# Patient Record
Sex: MALE | Race: WHITE | Employment: FULL TIME | URBAN - NONMETROPOLITAN AREA
[De-identification: names, ages, dates, MRNs, and addresses within clinical notes are randomized per-mention and may not be internally consistent; named-entity substitution may affect disease eponyms.]

---

## 2018-03-07 ENCOUNTER — APPOINTMENT (OUTPATIENT)
Dept: GENERAL RADIOLOGY | Age: 23
End: 2018-03-07
Payer: COMMERCIAL

## 2018-03-07 ENCOUNTER — HOSPITAL ENCOUNTER (EMERGENCY)
Age: 23
Discharge: HOME | End: 2018-03-07
Payer: COMMERCIAL

## 2018-03-07 VITALS
DIASTOLIC BLOOD PRESSURE: 98 MMHG | SYSTOLIC BLOOD PRESSURE: 133 MMHG | TEMPERATURE: 98.1 F | WEIGHT: 240 LBS | OXYGEN SATURATION: 98 % | BODY MASS INDEX: 42.51 KG/M2 | HEART RATE: 98 BPM | RESPIRATION RATE: 18 BRPM

## 2018-03-07 DIAGNOSIS — S60.511A ABRASION OF MULTIPLE SITES OF RIGHT HAND AND FINGER, INITIAL ENCOUNTER: Primary | ICD-10-CM

## 2018-03-07 DIAGNOSIS — S60.419A ABRASION OF MULTIPLE SITES OF RIGHT HAND AND FINGER, INITIAL ENCOUNTER: Primary | ICD-10-CM

## 2018-03-07 PROCEDURE — 6370000000 HC RX 637 (ALT 250 FOR IP): Performed by: STUDENT IN AN ORGANIZED HEALTH CARE EDUCATION/TRAINING PROGRAM

## 2018-03-07 PROCEDURE — 73130 X-RAY EXAM OF HAND: CPT

## 2018-03-07 PROCEDURE — 99283 EMERGENCY DEPT VISIT LOW MDM: CPT

## 2018-03-07 RX ORDER — IBUPROFEN 200 MG
400 TABLET ORAL ONCE
Status: COMPLETED | OUTPATIENT
Start: 2018-03-07 | End: 2018-03-07

## 2018-03-07 RX ORDER — IBUPROFEN 200 MG
TABLET ORAL ONCE
Status: COMPLETED | OUTPATIENT
Start: 2018-03-07 | End: 2018-03-07

## 2018-03-07 RX ADMIN — IBUPROFEN 400 MG: 200 TABLET, FILM COATED ORAL at 19:23

## 2018-03-07 RX ADMIN — BACITRACIN, NEOMYCIN, POLYMYXIN B 3.5 G: 400; 3.5; 5 OINTMENT TOPICAL at 19:23

## 2018-03-07 ASSESSMENT — ENCOUNTER SYMPTOMS
EYE REDNESS: 0
SHORTNESS OF BREATH: 0
RHINORRHEA: 0
COUGH: 0
BACK PAIN: 0
SORE THROAT: 0
DIARRHEA: 0
VOMITING: 0
EYE DISCHARGE: 0
WHEEZING: 0
ABDOMINAL PAIN: 0
NAUSEA: 0

## 2018-03-07 ASSESSMENT — PAIN DESCRIPTION - PAIN TYPE: TYPE: ACUTE PAIN

## 2018-03-07 ASSESSMENT — PAIN DESCRIPTION - ORIENTATION: ORIENTATION: RIGHT

## 2018-03-07 ASSESSMENT — PAIN SCALES - GENERAL: PAINLEVEL_OUTOF10: 5

## 2018-03-07 ASSESSMENT — PAIN DESCRIPTION - DESCRIPTORS: DESCRIPTORS: ACHING

## 2018-03-07 ASSESSMENT — PAIN DESCRIPTION - LOCATION: LOCATION: HAND

## 2018-03-08 NOTE — ED PROVIDER NOTES
Tympanostomy tube placement. CURRENT MEDICATIONS       Discharge Medication List as of 3/7/2018  7:51 PM      CONTINUE these medications which have NOT CHANGED    Details   naproxen (NAPROSYN) 500 MG tablet Take 1 tablet by mouth 2 times daily, Disp-60 tablet, R-0             ALLERGIES     is allergic to latex. FAMILY HISTORY     indicated that his mother is alive. He indicated that his father is alive. He indicated that his maternal grandmother is . He indicated that his maternal grandfather is . He indicated that his paternal grandmother is alive. He indicated that the status of his paternal grandfather is unknown.    family history includes Bipolar Disorder in his mother; COPD in his mother and paternal grandmother; Cancer in his maternal grandmother; Diabetes in his maternal grandfather; Heart Disease in his father and paternal grandmother; High Blood Pressure in his father, maternal grandfather, and maternal grandmother; Kidney Disease in his maternal grandmother; Obesity in his maternal grandfather; Other in his maternal grandfather; Stroke in his maternal grandmother. SOCIAL HISTORY      reports that he has been smoking Cigarettes. He has been smoking about 2.00 packs per day. He has quit using smokeless tobacco. He reports that he drinks alcohol. He reports that he uses drugs, including Marijuana, about 7 times per week. PHYSICAL EXAM     INITIAL VITALS:  weight is 240 lb (108.9 kg). His oral temperature is 98.1 °F (36.7 °C). His blood pressure is 133/98 (abnormal) and his pulse is 98. His respiration is 18 and oxygen saturation is 98%. Physical Exam   Constitutional: He is oriented to person, place, and time. He appears well-developed and well-nourished. HENT:   Head: Normocephalic and atraumatic. Right Ear: External ear normal.   Left Ear: External ear normal.   Eyes: Conjunctivae are normal. Right eye exhibits no discharge. Left eye exhibits no discharge.  No scleral

## 2019-06-10 ENCOUNTER — HOSPITAL ENCOUNTER (EMERGENCY)
Age: 24
Discharge: HOME | End: 2019-06-10
Payer: COMMERCIAL

## 2019-06-10 VITALS
OXYGEN SATURATION: 97 % | DIASTOLIC BLOOD PRESSURE: 99 MMHG | TEMPERATURE: 98.3 F | HEIGHT: 63 IN | HEART RATE: 83 BPM | RESPIRATION RATE: 18 BRPM | BODY MASS INDEX: 42.52 KG/M2 | SYSTOLIC BLOOD PRESSURE: 143 MMHG | WEIGHT: 240 LBS

## 2019-06-10 DIAGNOSIS — L23.7 POISON IVY: Primary | ICD-10-CM

## 2019-06-10 RX ORDER — METHYLPREDNISOLONE SODIUM SUCCINATE 125 MG/2ML
80 INJECTION, POWDER, LYOPHILIZED, FOR SOLUTION INTRAMUSCULAR; INTRAVENOUS ONCE
Status: DISCONTINUED | OUTPATIENT
Start: 2019-06-10 | End: 2019-06-10 | Stop reason: HOSPADM

## 2019-06-10 RX ORDER — DIPHENHYDRAMINE HCL 25 MG
25 TABLET ORAL ONCE
Status: DISCONTINUED | OUTPATIENT
Start: 2019-06-10 | End: 2019-06-10 | Stop reason: HOSPADM

## 2019-06-10 RX ORDER — FAMOTIDINE 20 MG/1
20 TABLET, FILM COATED ORAL ONCE
Status: DISCONTINUED | OUTPATIENT
Start: 2019-06-10 | End: 2019-06-10 | Stop reason: HOSPADM

## 2019-06-10 NOTE — LETTER
[address] [facility] [place] [facility], [address]  Phone: [phone]  Fax: [phone]     June 11, 2019    [address] [facility] [address]    Dear Abelino Opitz,    This letter is regarding your Emergency Department (ED) visit at [address] [address] on 6/10/19. Reyna Cogan wanted to make sure that you understand your discharge instructions and that you were able to fill any prescriptions that may have been ordered for you. Please contact the office at the above phone number if the ED advised you to follow up with Reyna Cogan, or if you have any further questions or needs. Also did you know -   *Visiting the ED for a non-emergency could result in higher co-pays than you would normally be subject to paying? [facility]) practices can often offer you an appointment on the same day that you call. *We have some [facility] offices that offer Walk-in appointments; check our website for availability in your community, [email or web address].      *Evisits are now available for patients for $36 through [employer] for certain conditions:  * Sinus, cold and or cough       * Diarrhea            * Headache  * Heartburn                                * Poison Cristiane          * Back pain     * Urinary problems                         If you do not have [employer] and are interested, please contact the office and a staff member may assist you or go to [email or web address].     Sincerely,     MARY JANE Kuo CNP and your [facility]

## 2019-06-11 ENCOUNTER — TELEPHONE (OUTPATIENT)
Dept: FAMILY MEDICINE CLINIC | Age: 24
End: 2019-06-11

## 2019-06-12 NOTE — ED PROVIDER NOTES
The patient was unable to be found in the waiting room after presenting for poison ivy. Multiple attempts were made to locate the patient. The patient eloped before I was able to see him. The patient eloped without initiation of my evaluation and workup. Therefore, the patient eloped before I could obtain appropriate lab work and imaging, make any necessary consults, admit or discharge, or initiate appropriate treatment.      Torsten Muir PA-C  6/12/19  0862     Torsten Muir PA-C  06/12/19 8099       Torsten Muir PA-C  06/12/19 0518

## 2019-10-09 ENCOUNTER — TELEPHONE (OUTPATIENT)
Dept: FAMILY MEDICINE CLINIC | Age: 24
End: 2019-10-09

## 2019-10-09 ENCOUNTER — OFFICE VISIT (OUTPATIENT)
Dept: FAMILY MEDICINE CLINIC | Age: 24
End: 2019-10-09
Payer: COMMERCIAL

## 2019-10-09 VITALS
TEMPERATURE: 98.3 F | BODY MASS INDEX: 39.25 KG/M2 | DIASTOLIC BLOOD PRESSURE: 62 MMHG | SYSTOLIC BLOOD PRESSURE: 102 MMHG | HEART RATE: 72 BPM | WEIGHT: 221.6 LBS | RESPIRATION RATE: 18 BRPM
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
## 2019-10-09 DIAGNOSIS — K29.00 ACUTE SUPERFICIAL GASTRITIS WITHOUT HEMORRHAGE: Primary | ICD-10-CM

## 2019-10-09 DIAGNOSIS — Z13.220 SCREENING FOR LIPID DISORDERS: ICD-10-CM

## 2019-10-09 DIAGNOSIS — Z72.51 HIGH RISK HETEROSEXUAL BEHAVIOR: ICD-10-CM

## 2019-10-09 DIAGNOSIS — R35.0 URINARY FREQUENCY: ICD-10-CM

## 2019-10-09 DIAGNOSIS — J45.30 MILD PERSISTENT ASTHMA WITHOUT COMPLICATION: ICD-10-CM

## 2019-10-09 DIAGNOSIS — M54.59 MECHANICAL LOW BACK PAIN: ICD-10-CM

## 2019-10-09 PROCEDURE — G8484 FLU IMMUNIZE NO ADMIN: HCPCS | Performed by: NURSE PRACTITIONER

## 2019-10-09 PROCEDURE — 99213 OFFICE O/P EST LOW 20 MIN: CPT | Performed by: NURSE PRACTITIONER

## 2019-10-09 PROCEDURE — G8427 DOCREV CUR MEDS BY ELIG CLIN: HCPCS | Performed by: NURSE PRACTITIONER

## 2019-10-09 PROCEDURE — G8417 CALC BMI ABV UP PARAM F/U: HCPCS | Performed by: NURSE PRACTITIONER

## 2019-10-09 PROCEDURE — 4004F PT TOBACCO SCREEN RCVD TLK: CPT | Performed by: NURSE PRACTITIONER

## 2019-10-09 RX ORDER — ALBUTEROL SULFATE 90 UG/1
2 AEROSOL, METERED RESPIRATORY (INHALATION) EVERY 6 HOURS PRN
Qty: 1 INHALER | Refills: 3 | Status: SHIPPED | OUTPATIENT
Start: 2019-10-09 | End: 2021-11-03 | Stop reason: SDUPTHER

## 2019-10-09 RX ORDER — AMOXICILLIN AND CLAVULANATE POTASSIUM 875; 125 MG/1; MG/1
1 TABLET, FILM COATED ORAL 2 TIMES DAILY WITH MEALS
Qty: 20 TABLET | Refills: 0 | Status: SHIPPED | OUTPATIENT
Start: 2019-10-09 | End: 2019-10-19

## 2019-10-09 RX ORDER — OMEPRAZOLE 40 MG/1
40 CAPSULE, DELAYED RELEASE ORAL
Qty: 30 CAPSULE | Refills: 0 | Status: SHIPPED | OUTPATIENT
Start: 2019-10-09 | End: 2020-04-01

## 2019-10-09 ASSESSMENT — ENCOUNTER SYMPTOMS
COUGH: 1
VOMITING: 0
CONSTIPATION: 0
WHEEZING: 1
DIARRHEA: 0
NAUSEA: 1
ABDOMINAL PAIN: 1
SHORTNESS OF BREATH: 1

## 2019-10-09 ASSESSMENT — PATIENT HEALTH QUESTIONNAIRE - PHQ9
2. FEELING DOWN, DEPRESSED OR HOPELESS: 0
SUM OF ALL RESPONSES TO PHQ QUESTIONS 1-9: 0
SUM OF ALL RESPONSES TO PHQ9 QUESTIONS 1 & 2: 0
1. LITTLE INTEREST OR PLEASURE IN DOING THINGS: 0
SUM OF ALL RESPONSES TO PHQ QUESTIONS 1-9: 0

## 2019-10-21 ENCOUNTER — HOSPITAL ENCOUNTER (OUTPATIENT)
Dept: PULMONOLOGY | Age: 24
Discharge: HOME | End: 2019-10-21
Payer: COMMERCIAL

## 2019-10-21 DIAGNOSIS — J45.30 MILD PERSISTENT ASTHMA WITHOUT COMPLICATION: ICD-10-CM

## 2019-10-21 PROCEDURE — 94060 EVALUATION OF WHEEZING: CPT

## 2019-10-21 PROCEDURE — 94729 DIFFUSING CAPACITY: CPT

## 2019-10-21 PROCEDURE — 94726 PLETHYSMOGRAPHY LUNG VOLUMES: CPT

## 2020-04-01 ENCOUNTER — HOSPITAL ENCOUNTER (INPATIENT)
Age: 25
LOS: 5 days | Discharge: HOME | DRG: 720 | End: 2020-04-06
Attending: EMERGENCY MEDICINE | Admitting: FAMILY MEDICINE
Payer: COMMERCIAL

## 2020-04-01 ENCOUNTER — APPOINTMENT (OUTPATIENT)
Dept: CT IMAGING | Age: 25
DRG: 720 | End: 2020-04-01
Payer: COMMERCIAL

## 2020-04-01 PROBLEM — R06.02 SHORTNESS OF BREATH: Status: ACTIVE | Noted: 2020-04-01

## 2020-04-01 LAB
ACINETOBACTER BAUMANNII FILM ARRAY: NOT DETECTED
ALBUMIN SERPL-MCNC: 3.6 G/DL (ref 3.5–5.1)
ALBUMIN SERPL-MCNC: 4 G/DL (ref 3.5–5.1)
ALP BLD-CCNC: 63 U/L (ref 38–126)
ALP BLD-CCNC: 78 U/L (ref 38–126)
ALT SERPL-CCNC: 10 U/L (ref 11–66)
ALT SERPL-CCNC: 12 U/L (ref 11–66)
AMPHETAMINE+METHAMPHETAMINE URINE SCREEN: NEGATIVE
ANION GAP SERPL CALCULATED.3IONS-SCNC: 11 MEQ/L (ref 8–16)
ANION GAP SERPL CALCULATED.3IONS-SCNC: 17 MEQ/L (ref 8–16)
AST SERPL-CCNC: 18 U/L (ref 5–40)
AST SERPL-CCNC: 24 U/L (ref 5–40)
BARBITURATE QUANTITATIVE URINE: NEGATIVE
BASE EXCESS (CALCULATED): 1.6 MMOL/L (ref -2.5–2.5)
BASOPHILS # BLD: 0.4 %
BASOPHILS ABSOLUTE: 0.1 THOU/MM3 (ref 0–0.1)
BENZODIAZEPINE QUANTITATIVE URINE: NEGATIVE
BILIRUB SERPL-MCNC: 0.4 MG/DL (ref 0.3–1.2)
BILIRUB SERPL-MCNC: 0.4 MG/DL (ref 0.3–1.2)
BILIRUBIN DIRECT: < 0.2 MG/DL (ref 0–0.3)
BILIRUBIN URINE: NEGATIVE
BLOOD, URINE: NEGATIVE
BOTTLE TYPE: ABNORMAL
BUN BLDV-MCNC: 11 MG/DL (ref 7–22)
BUN BLDV-MCNC: 13 MG/DL (ref 7–22)
C-REACTIVE PROTEIN: 0.54 MG/DL (ref 0–1)
CALCIUM SERPL-MCNC: 8.1 MG/DL (ref 8.5–10.5)
CALCIUM SERPL-MCNC: 8.5 MG/DL (ref 8.5–10.5)
CANDIDA ALBICANS FILM ARRAY: NOT DETECTED
CANDIDA GLABRATA FILM ARRAY: NOT DETECTED
CANDIDA KRUSEI FILM ARRAY: NOT DETECTED
CANDIDA PARAPSILOSIS FILM ARRAY: NOT DETECTED
CANDIDA TROPICALIS FILM ARRAY: NOT DETECTED
CANNABINOID QUANTITATIVE URINE: NEGATIVE
CARBAPENEM RESITANT FILM ARRAY: ABNORMAL
CHARACTER, URINE: CLEAR
CHLORIDE BLD-SCNC: 95 MEQ/L (ref 98–111)
CHLORIDE BLD-SCNC: 99 MEQ/L (ref 98–111)
CO2: 22 MEQ/L (ref 23–33)
CO2: 25 MEQ/L (ref 23–33)
COCAINE METABOLITE QUANTITATIVE URINE: POSITIVE
COLLECTED BY:: NORMAL
COLOR: YELLOW
CREAT SERPL-MCNC: 0.8 MG/DL (ref 0.4–1.2)
CREAT SERPL-MCNC: 1 MG/DL (ref 0.4–1.2)
EKG ATRIAL RATE: 123 BPM
EKG P AXIS: 58 DEGREES
EKG P-R INTERVAL: 136 MS
EKG Q-T INTERVAL: 314 MS
EKG QRS DURATION: 84 MS
EKG QTC CALCULATION (BAZETT): 449 MS
EKG R AXIS: 39 DEGREES
EKG T AXIS: 56 DEGREES
EKG VENTRICULAR RATE: 123 BPM
ENTERBACTER CLOACAE FILM ARRAY: NOT DETECTED
ENTERBACTERIACEAE FILM ARRAY: NOT DETECTED
ENTEROCOCCUS FILM ARRAY: NOT DETECTED
EOSINOPHIL # BLD: 0 %
EOSINOPHILS ABSOLUTE: 0 THOU/MM3 (ref 0–0.4)
ERYTHROCYTE [DISTWIDTH] IN BLOOD BY AUTOMATED COUNT: 12.9 % (ref 11.5–14.5)
ERYTHROCYTE [DISTWIDTH] IN BLOOD BY AUTOMATED COUNT: 41.5 FL (ref 35–45)
ESCHERICHIA COLI FILM ARRAY: NOT DETECTED
ETHYL ALCOHOL, SERUM: < 0.01 %
FERRITIN: 101 NG/ML (ref 22–322)
FLU A ANTIGEN: NEGATIVE
FLU B ANTIGEN: NEGATIVE
GFR SERPL CREATININE-BSD FRML MDRD: > 90 ML/MIN/1.73M2
GFR SERPL CREATININE-BSD FRML MDRD: > 90 ML/MIN/1.73M2
GLUCOSE BLD-MCNC: 126 MG/DL (ref 70–108)
GLUCOSE BLD-MCNC: 95 MG/DL (ref 70–108)
GLUCOSE URINE: NEGATIVE MG/DL
GROUP A STREP CULTURE, REFLEX: NEGATIVE
HAEMOPHILUS INFLUENZA FILM ARRAY: NOT DETECTED
HCO3: 27 MMOL/L (ref 23–28)
HCT VFR BLD CALC: 46.7 % (ref 42–52)
HEMOGLOBIN: 15.8 GM/DL (ref 14–18)
IMMATURE GRANS (ABS): 0.1 THOU/MM3 (ref 0–0.07)
IMMATURE GRANULOCYTES: 0.5 %
KETONES, URINE: NEGATIVE
KLEBSIELLA OXYTOCA FILM ARRAY: NOT DETECTED
KLEBSIELLA PNEUMONIAE FILM ARRAY: NOT DETECTED
LACTIC ACID, SEPSIS: 1.6 MMOL/L (ref 0.5–1.9)
LD: 324 U/L (ref 100–190)
LEGIONELLA PNEUMOPHILIA AG, URINE: NEGATIVE
LEUKOCYTE ESTERASE, URINE: NEGATIVE
LIPASE: 11.4 U/L (ref 5.6–51.3)
LISTERIA MONOCYTOGENES FILM ARRAY: NOT DETECTED
LYMPHOCYTES # BLD: 5.5 %
LYMPHOCYTES ABSOLUTE: 1.2 THOU/MM3 (ref 1–4.8)
MAGNESIUM: 1.9 MG/DL (ref 1.6–2.4)
MCH RBC QN AUTO: 29.9 PG (ref 26–33)
MCHC RBC AUTO-ENTMCNC: 33.8 GM/DL (ref 32.2–35.5)
MCV RBC AUTO: 88.4 FL (ref 80–94)
METHICILLIN RESISTANT FILM ARRAY: ABNORMAL
MONOCYTES # BLD: 5.3 %
MONOCYTES ABSOLUTE: 1.2 THOU/MM3 (ref 0.4–1.3)
MRSA SCREEN RT-PCR: NEGATIVE
NEISSERIA MENIGITIDIS FILM ARRAY: NOT DETECTED
NITRITE, URINE: NEGATIVE
NUCLEATED RED BLOOD CELLS: 0 /100 WBC
O2 SATURATION: 97 %
OPIATES, URINE: NEGATIVE
OSMOLALITY CALCULATION: 268.2 MOSMOL/KG (ref 275–300)
OSMOLALITY CALCULATION: 271 MOSMOL/KG (ref 275–300)
OXYCODONE: NEGATIVE
PCO2: 42 MMHG (ref 35–45)
PH BLOOD GAS: 7.41 (ref 7.35–7.45)
PH UA: 6 (ref 5–9)
PHENCYCLIDINE QUANTITATIVE URINE: NEGATIVE
PLATELET # BLD: 285 THOU/MM3 (ref 130–400)
PMV BLD AUTO: 9.6 FL (ref 9.4–12.4)
PO2: 86 MMHG (ref 71–104)
POTASSIUM REFLEX MAGNESIUM: 4.6 MEQ/L (ref 3.5–5.2)
POTASSIUM SERPL-SCNC: 4.7 MEQ/L (ref 3.5–5.2)
PRO-BNP: 39.3 PG/ML (ref 0–450)
PROCALCITONIN: 1.04 NG/ML (ref 0.01–0.09)
PROTEIN UA: NEGATIVE
PROTEUS FILM ARRAY: NOT DETECTED
PSEUDOMONAS AERUGINOSA FILM ARRAY: NOT DETECTED
RBC # BLD: 5.28 MILL/MM3 (ref 4.7–6.1)
REFLEX THROAT C + S: NORMAL
SEG NEUTROPHILS: 88.3 %
SEGMENTED NEUTROPHILS ABSOLUTE COUNT: 19.4 THOU/MM3 (ref 1.8–7.7)
SERRATIA MARCESCENS FILM ARRAY: NOT DETECTED
SODIUM BLD-SCNC: 134 MEQ/L (ref 135–145)
SODIUM BLD-SCNC: 135 MEQ/L (ref 135–145)
SOURCE OF BLOOD CULTURE: ABNORMAL
SPECIFIC GRAVITY, URINE: 1.01 (ref 1–1.03)
STAPH AUREUS FILM ARRAY: NOT DETECTED
STAPHYLOCOCCUS FILM ARRAY: NOT DETECTED
STREP AGALACTIAE FILM ARRAY: NOT DETECTED
STREP PNEUMONIAE FILM ARRAY: NOT DETECTED
STREP PYOCGENES FILM ARRAY: NOT DETECTED
STREPTOCOCCUS FILM ARRAY: DETECTED
TOTAL PROTEIN: 5.6 G/DL (ref 6.1–8)
TOTAL PROTEIN: 6.2 G/DL (ref 6.1–8)
TROPONIN T: < 0.01 NG/ML
TROPONIN T: < 0.01 NG/ML
TSH SERPL DL<=0.05 MIU/L-ACNC: 2.07 UIU/ML (ref 0.4–4.2)
UROBILINOGEN, URINE: 0.2 EU/DL (ref 0–1)
VANCOMYCIN RESISTANT ENTEROCOCCUS: NEGATIVE
VANCOMYCIN RESISTANT FILM ARRAY: ABNORMAL
WBC # BLD: 22 THOU/MM3 (ref 4.8–10.8)

## 2020-04-01 PROCEDURE — 93005 ELECTROCARDIOGRAM TRACING: CPT | Performed by: FAMILY MEDICINE

## 2020-04-01 PROCEDURE — 99285 EMERGENCY DEPT VISIT HI MDM: CPT

## 2020-04-01 PROCEDURE — U0002 COVID-19 LAB TEST NON-CDC: HCPCS

## 2020-04-01 PROCEDURE — 2700000000 HC OXYGEN THERAPY PER DAY

## 2020-04-01 PROCEDURE — 83690 ASSAY OF LIPASE: CPT

## 2020-04-01 PROCEDURE — 87081 CULTURE SCREEN ONLY: CPT

## 2020-04-01 PROCEDURE — 6370000000 HC RX 637 (ALT 250 FOR IP): Performed by: EMERGENCY MEDICINE

## 2020-04-01 PROCEDURE — 80053 COMPREHEN METABOLIC PANEL: CPT

## 2020-04-01 PROCEDURE — 2580000003 HC RX 258: Performed by: EMERGENCY MEDICINE

## 2020-04-01 PROCEDURE — 93010 ELECTROCARDIOGRAM REPORT: CPT | Performed by: NUCLEAR MEDICINE

## 2020-04-01 PROCEDURE — 86140 C-REACTIVE PROTEIN: CPT

## 2020-04-01 PROCEDURE — 82728 ASSAY OF FERRITIN: CPT

## 2020-04-01 PROCEDURE — 80307 DRUG TEST PRSMV CHEM ANLYZR: CPT

## 2020-04-01 PROCEDURE — 99223 1ST HOSP IP/OBS HIGH 75: CPT | Performed by: PHYSICIAN ASSISTANT

## 2020-04-01 PROCEDURE — 87077 CULTURE AEROBIC IDENTIFY: CPT

## 2020-04-01 PROCEDURE — 81003 URINALYSIS AUTO W/O SCOPE: CPT

## 2020-04-01 PROCEDURE — 2060000000 HC ICU INTERMEDIATE R&B

## 2020-04-01 PROCEDURE — 71275 CT ANGIOGRAPHY CHEST: CPT

## 2020-04-01 PROCEDURE — 6360000002 HC RX W HCPCS: Performed by: FAMILY MEDICINE

## 2020-04-01 PROCEDURE — 87899 AGENT NOS ASSAY W/OPTIC: CPT

## 2020-04-01 PROCEDURE — 6370000000 HC RX 637 (ALT 250 FOR IP): Performed by: FAMILY MEDICINE

## 2020-04-01 PROCEDURE — 87880 STREP A ASSAY W/OPTIC: CPT

## 2020-04-01 PROCEDURE — 85025 COMPLETE CBC W/AUTO DIFF WBC: CPT

## 2020-04-01 PROCEDURE — 87804 INFLUENZA ASSAY W/OPTIC: CPT

## 2020-04-01 PROCEDURE — 83605 ASSAY OF LACTIC ACID: CPT

## 2020-04-01 PROCEDURE — 87449 NOS EACH ORGANISM AG IA: CPT

## 2020-04-01 PROCEDURE — 87641 MR-STAPH DNA AMP PROBE: CPT

## 2020-04-01 PROCEDURE — 6360000004 HC RX CONTRAST MEDICATION: Performed by: EMERGENCY MEDICINE

## 2020-04-01 PROCEDURE — 87500 VANOMYCIN DNA AMP PROBE: CPT

## 2020-04-01 PROCEDURE — 36415 COLL VENOUS BLD VENIPUNCTURE: CPT

## 2020-04-01 PROCEDURE — G0480 DRUG TEST DEF 1-7 CLASSES: HCPCS

## 2020-04-01 PROCEDURE — 87186 SC STD MICRODIL/AGAR DIL: CPT

## 2020-04-01 PROCEDURE — 83880 ASSAY OF NATRIURETIC PEPTIDE: CPT

## 2020-04-01 PROCEDURE — 94640 AIRWAY INHALATION TREATMENT: CPT

## 2020-04-01 PROCEDURE — 83615 LACTATE (LD) (LDH) ENZYME: CPT

## 2020-04-01 PROCEDURE — 82803 BLOOD GASES ANY COMBINATION: CPT

## 2020-04-01 PROCEDURE — 36600 WITHDRAWAL OF ARTERIAL BLOOD: CPT

## 2020-04-01 PROCEDURE — 2580000003 HC RX 258: Performed by: FAMILY MEDICINE

## 2020-04-01 PROCEDURE — 83735 ASSAY OF MAGNESIUM: CPT

## 2020-04-01 PROCEDURE — 6360000002 HC RX W HCPCS: Performed by: EMERGENCY MEDICINE

## 2020-04-01 PROCEDURE — 84484 ASSAY OF TROPONIN QUANT: CPT

## 2020-04-01 PROCEDURE — 2709999900 HC NON-CHARGEABLE SUPPLY

## 2020-04-01 PROCEDURE — 84443 ASSAY THYROID STIM HORMONE: CPT

## 2020-04-01 PROCEDURE — 94761 N-INVAS EAR/PLS OXIMETRY MLT: CPT

## 2020-04-01 PROCEDURE — 87801 DETECT AGNT MULT DNA AMPLI: CPT

## 2020-04-01 PROCEDURE — 87040 BLOOD CULTURE FOR BACTERIA: CPT

## 2020-04-01 PROCEDURE — 87070 CULTURE OTHR SPECIMN AEROBIC: CPT

## 2020-04-01 PROCEDURE — 84145 PROCALCITONIN (PCT): CPT

## 2020-04-01 PROCEDURE — 82248 BILIRUBIN DIRECT: CPT

## 2020-04-01 RX ORDER — ACETAMINOPHEN 650 MG/1
650 SUPPOSITORY RECTAL EVERY 6 HOURS PRN
Status: DISCONTINUED | OUTPATIENT
Start: 2020-04-01 | End: 2020-04-06 | Stop reason: HOSPADM

## 2020-04-01 RX ORDER — IPRATROPIUM BROMIDE AND ALBUTEROL SULFATE 2.5; .5 MG/3ML; MG/3ML
1 SOLUTION RESPIRATORY (INHALATION) ONCE
Status: COMPLETED | OUTPATIENT
Start: 2020-04-01 | End: 2020-04-01

## 2020-04-01 RX ORDER — ONDANSETRON 2 MG/ML
4 INJECTION INTRAMUSCULAR; INTRAVENOUS EVERY 6 HOURS PRN
Status: DISCONTINUED | OUTPATIENT
Start: 2020-04-01 | End: 2020-04-06 | Stop reason: HOSPADM

## 2020-04-01 RX ORDER — ACETAMINOPHEN 325 MG/1
650 TABLET ORAL EVERY 6 HOURS PRN
Status: DISCONTINUED | OUTPATIENT
Start: 2020-04-01 | End: 2020-04-06 | Stop reason: HOSPADM

## 2020-04-01 RX ORDER — SODIUM CHLORIDE 0.9 % (FLUSH) 0.9 %
10 SYRINGE (ML) INJECTION EVERY 12 HOURS SCHEDULED
Status: DISCONTINUED | OUTPATIENT
Start: 2020-04-01 | End: 2020-04-06 | Stop reason: HOSPADM

## 2020-04-01 RX ORDER — GUAIFENESIN/DEXTROMETHORPHAN 100-10MG/5
5 SYRUP ORAL ONCE
Status: COMPLETED | OUTPATIENT
Start: 2020-04-01 | End: 2020-04-01

## 2020-04-01 RX ORDER — SODIUM CHLORIDE 0.9 % (FLUSH) 0.9 %
10 SYRINGE (ML) INJECTION PRN
Status: DISCONTINUED | OUTPATIENT
Start: 2020-04-01 | End: 2020-04-06 | Stop reason: HOSPADM

## 2020-04-01 RX ORDER — 0.9 % SODIUM CHLORIDE 0.9 %
1000 INTRAVENOUS SOLUTION INTRAVENOUS ONCE
Status: COMPLETED | OUTPATIENT
Start: 2020-04-01 | End: 2020-04-01

## 2020-04-01 RX ORDER — ALBUTEROL SULFATE 90 UG/1
2 AEROSOL, METERED RESPIRATORY (INHALATION) EVERY 6 HOURS PRN
Status: DISCONTINUED | OUTPATIENT
Start: 2020-04-01 | End: 2020-04-06 | Stop reason: HOSPADM

## 2020-04-01 RX ORDER — NICOTINE 21 MG/24HR
1 PATCH, TRANSDERMAL 24 HOURS TRANSDERMAL DAILY
Status: DISCONTINUED | OUTPATIENT
Start: 2020-04-01 | End: 2020-04-06 | Stop reason: HOSPADM

## 2020-04-01 RX ORDER — LEVOFLOXACIN 5 MG/ML
750 INJECTION, SOLUTION INTRAVENOUS EVERY 24 HOURS
Status: DISCONTINUED | OUTPATIENT
Start: 2020-04-01 | End: 2020-04-04

## 2020-04-01 RX ORDER — LEVOFLOXACIN 5 MG/ML
500 INJECTION, SOLUTION INTRAVENOUS ONCE
Status: COMPLETED | OUTPATIENT
Start: 2020-04-01 | End: 2020-04-01

## 2020-04-01 RX ORDER — ALBUTEROL SULFATE 90 UG/1
2 AEROSOL, METERED RESPIRATORY (INHALATION) EVERY 6 HOURS PRN
Status: DISCONTINUED | OUTPATIENT
Start: 2020-04-01 | End: 2020-04-01

## 2020-04-01 RX ORDER — IBUPROFEN 200 MG
400 TABLET ORAL EVERY 6 HOURS PRN
COMMUNITY

## 2020-04-01 RX ADMIN — IPRATROPIUM BROMIDE AND ALBUTEROL SULFATE 1 AMPULE: .5; 3 SOLUTION RESPIRATORY (INHALATION) at 01:53

## 2020-04-01 RX ADMIN — LEVOFLOXACIN 500 MG: 5 INJECTION, SOLUTION INTRAVENOUS at 02:36

## 2020-04-01 RX ADMIN — SODIUM CHLORIDE 1000 ML: 9 INJECTION, SOLUTION INTRAVENOUS at 02:32

## 2020-04-01 RX ADMIN — IOPAMIDOL 80 ML: 755 INJECTION, SOLUTION INTRAVENOUS at 01:52

## 2020-04-01 RX ADMIN — ENOXAPARIN SODIUM 40 MG: 40 INJECTION SUBCUTANEOUS at 08:05

## 2020-04-01 RX ADMIN — ACETAMINOPHEN 650 MG: 325 TABLET ORAL at 17:50

## 2020-04-01 RX ADMIN — VANCOMYCIN HYDROCHLORIDE 1500 MG: 1 INJECTION, POWDER, LYOPHILIZED, FOR SOLUTION INTRAVENOUS at 05:42

## 2020-04-01 RX ADMIN — AZITHROMYCIN 500 MG: 500 INJECTION, POWDER, LYOPHILIZED, FOR SOLUTION INTRAVENOUS at 02:35

## 2020-04-01 RX ADMIN — SODIUM CHLORIDE 1000 ML: 9 INJECTION, SOLUTION INTRAVENOUS at 04:43

## 2020-04-01 RX ADMIN — SODIUM CHLORIDE, PRESERVATIVE FREE 10 ML: 5 INJECTION INTRAVENOUS at 19:46

## 2020-04-01 RX ADMIN — GUAIFENESIN AND DEXTROMETHORPHAN 5 ML: 100; 10 SYRUP ORAL at 02:32

## 2020-04-01 RX ADMIN — LEVOFLOXACIN 750 MG: 5 INJECTION, SOLUTION INTRAVENOUS at 05:42

## 2020-04-01 ASSESSMENT — ENCOUNTER SYMPTOMS
ABDOMINAL DISTENTION: 0
SHORTNESS OF BREATH: 1
RHINORRHEA: 0
VOMITING: 0
VOICE CHANGE: 0
ABDOMINAL PAIN: 0
NAUSEA: 0
COUGH: 1
EYE ITCHING: 0
PHOTOPHOBIA: 0
DIARRHEA: 0
EYE REDNESS: 0
CONSTIPATION: 0
EYE PAIN: 0
WHEEZING: 0
CHOKING: 0
BLOOD IN STOOL: 0
CHEST TIGHTNESS: 0
SINUS PRESSURE: 0
EYE DISCHARGE: 0
BACK PAIN: 0
TROUBLE SWALLOWING: 0
SORE THROAT: 0

## 2020-04-01 ASSESSMENT — PAIN SCALES - GENERAL
PAINLEVEL_OUTOF10: 0
PAINLEVEL_OUTOF10: 0
PAINLEVEL_OUTOF10: 7
PAINLEVEL_OUTOF10: 0
PAINLEVEL_OUTOF10: 1
PAINLEVEL_OUTOF10: 0
PAINLEVEL_OUTOF10: 6
PAINLEVEL_OUTOF10: 0

## 2020-04-01 ASSESSMENT — PAIN DESCRIPTION - PAIN TYPE: TYPE: ACUTE PAIN

## 2020-04-01 ASSESSMENT — PAIN DESCRIPTION - LOCATION: LOCATION: BACK

## 2020-04-01 NOTE — ED NOTES
RT in room with pt getting ABG. Pt VS updated. Urine specimen collected and sent to lab.       Zenia Rinne, RN  04/01/20 8486

## 2020-04-01 NOTE — H&P
2 times per day    enoxaparin  40 mg Subcutaneous Daily    vancomycin  1,500 mg Intravenous Q12H    sodium chloride  1,000 mL Intravenous Once    levofloxacin  750 mg Intravenous Q24H    vancomycin (VANCOCIN) intermittent dosing (placeholder)   Other RX Placeholder    cefTRIAXone (ROCEPHIN) IV  2 g Intravenous Q24H       Vital Signs:   /65   Pulse 97   Temp 99 °F (37.2 °C) (Oral)   Resp 30   Ht 5' 3\" (1.6 m)   Wt 214 lb (97.1 kg)   SpO2 97%   BMI 37.91 kg/m²      Intake/Output Summary (Last 24 hours) at 4/1/2020 0450  Last data filed at 4/1/2020 0343  Gross per 24 hour   Intake 100 ml   Output --   Net 100 ml        General:  White male well groomed, well-nourished, well-developed who appears stated age, in no acute distress lying in bed. Head: Normocephalic and atraumatic. EENT: No exophthalmos noted. No scleral or conjunctiva icterus, injection or pallor noted. Neck: Supple. Trachea midline. No thyromegaly. Thorax/Lungs: Thorax is symmetrical with good expansion. Breath sounds CTA and equal b/l without rales, wheezing, or rhonchi. No retractions or use of abdominal muscles. Cardiac: S1, S2, rate is fast but regular without murmur, rub, or gallop. No JVD  Abdomen: Abdomen soft, nontender to palpation, without guarding or rigidity. Normoactive BS. Peripheral Vasculature: Extremities warm, dry without edema, no varicosities or stasis changes, DP pulses 2+ b/l. Brisk capillary refill. Skin:  Skin warm and dry No lesions, rash. Psych:  Alert and oriented x3. Affect flat  Lymph:  No supraclavicular or cervical adenopathy. Neurologic: No focal deficits. No Seizures.      Data:   Labs:   Results for orders placed or performed during the hospital encounter of 04/01/20   Rapid influenza A/B antigens   Result Value Ref Range    Flu A Antigen NEGATIVE NEGATIVE    Flu B Antigen NEGATIVE NEGATIVE   CBC auto differential   Result Value Ref Range    WBC 22.0 (H) 4.8 - 10.8 thou/mm3    RBC 5.28 4.70 - 6.10 mill/mm3    Hemoglobin 15.8 14.0 - 18.0 gm/dl    Hematocrit 46.7 42.0 - 52.0 %    MCV 88.4 80.0 - 94.0 fL    MCH 29.9 26.0 - 33.0 pg    MCHC 33.8 32.2 - 35.5 gm/dl    RDW-CV 12.9 11.5 - 14.5 %    RDW-SD 41.5 35.0 - 45.0 fL    Platelets 278 698 - 724 thou/mm3    MPV 9.6 9.4 - 12.4 fL    Seg Neutrophils 88.3 %    Lymphocytes 5.5 %    Monocytes 5.3 %    Eosinophils 0.0 %    Basophils 0.4 %    Immature Granulocytes 0.5 %    Segs Absolute 19.4 (H) 1.8 - 7.7 thou/mm3    Lymphocytes Absolute 1.2 1.0 - 4.8 thou/mm3    Monocytes Absolute 1.2 0.4 - 1.3 thou/mm3    Eosinophils Absolute 0.0 0.0 - 0.4 thou/mm3    Basophils Absolute 0.1 0.0 - 0.1 thou/mm3    Immature Grans (Abs) 0.10 (H) 0.00 - 0.07 thou/mm3    nRBC 0 /100 wbc   Brain Natriuretic Peptide   Result Value Ref Range    Pro-BNP 39.3 0.0 - 450.0 pg/mL   Basic Metabolic Panel   Result Value Ref Range    Sodium 134 (L) 135 - 145 meq/L    Potassium 4.7 3.5 - 5.2 meq/L    Chloride 95 (L) 98 - 111 meq/L    CO2 22 (L) 23 - 33 meq/L    Glucose 95 70 - 108 mg/dL    BUN 13 7 - 22 mg/dL    CREATININE 1.0 0.4 - 1.2 mg/dL    Calcium 8.5 8.5 - 10.5 mg/dL   Hepatic function panel   Result Value Ref Range    Alb 4.0 3.5 - 5.1 g/dL    Total Bilirubin 0.4 0.3 - 1.2 mg/dL    Bilirubin, Direct <0.2 0.0 - 0.3 mg/dL    Alkaline Phosphatase 78 38 - 126 U/L    AST 24 5 - 40 U/L    ALT 12 11 - 66 U/L    Total Protein 6.2 6.1 - 8.0 g/dL   Lipase   Result Value Ref Range    Lipase 11.4 5.6 - 51.3 U/L   Troponin   Result Value Ref Range    Troponin T < 0.010 ng/ml   Magnesium   Result Value Ref Range    Magnesium 1.9 1.6 - 2.4 mg/dL   TSH without Reflex   Result Value Ref Range    TSH 2.070 0.400 - 4.20 uIU/mL   Lactate dehydrogenase   Result Value Ref Range     (H) 100 - 190 U/L   Blood gas, arterial   Result Value Ref Range    pH, Blood Gas 7.41 7.35 - 7.45    PCO2 42 35 - 45 mmhg    PO2 86 71 - 104 mmhg    HCO3 27 23 - 28 mmol/l    Base Excess (Calculated) 1.6 -2.5 - 2.5 degrees    T Axis 56 degrees       EKG / Radiology:     EKG:  Reviewed by me: Sinus tachycardia    Cta Chest W Wo Contrast    Result Date: 4/1/2020  PROCEDURE: CTA CHEST W WO CONTRAST CLINICAL INFORMATION: cp sob hypoxia tachycardia, PE protocol. COMPARISON: Chest x-ray dated 11/29/2015 TECHNIQUE: 3 mm thick by 1.5 mm interval axial images were obtained through the chest after the administration of IV contrast.  A non-contrast localizer was obtained. Sagittal and coronal MIP 3D reconstructions were performed on the scanner. 80 mL Isovue-370 were administered intravenously. All CT scans at this facility use dose modulation, iterative reconstruction, and/or weight-based dosing when appropriate to reduce radiation dose to as low as reasonably achievable. FINDINGS: Lines/tubes/devices: None Lungs: There are groundglass infiltrates predominantly in the upper lobes and mildly in the right middle and right lower lobes. These are more centrally in distribution rather than peripheral/subpleural. There is a minimal infiltrate or atelectasis in the medial left lower lobe. Imaging features can be seen with COVID-19 pneumonia, though are nonspecific and can occur with a variety of infectious and noninfectious processes, including pulmonary edema. The trachea and central bronchi are unremarkable. Pleura: There is no pleural effusion. There is no pneumothorax. Heart: Heart size is normal. There is no pericardial effusion. Pulmonary vasculature: There is adequate opacification of the pulmonary arteries. There is no pulmonary embolism. The main pulmonary artery is enlarged suggestive of pulmonary hypertension. There is a mild amount of air in the main pulmonary artery and likely related to vascular access procedure. Merna and mediastinum: There is no hilar or mediastinal mass or adenopathy. Thoracic aorta/vascular: There is no thoracic aortic aneurysm or dissection. The imaged brachiocephalic arteries are unremarkable.  Imaged upper

## 2020-04-01 NOTE — ED PROVIDER NOTES
[facility]  eMERGENCY dEPARTMENT eNCOUnter          CHIEF COMPLAINT       Chief Complaint   Patient presents with    Shortness of Breath       Nurses Notes reviewed and I agree except as noted in the HPI. HISTORY OF PRESENT ILLNESS    Darlis Severin is a 22 y.o. male who presents to the Emergency Department for the evaluation of shortness of breath. Patient reports an onset of 1 and 1/2 hours prior to arrival. Patient was brought in by EMS from home due to his symptoms. Patient reports additional symptoms of cough and chest pain. He denies any fevers. Patient had a SPO2 of 90% as squad arrived and he was put on 2 liters of oxygen with no real improvement. Patient has a history of asthma and anxiety, but denies any history cardiac complications. Patient denies any sick contacts and denies any recent travel. He report that he has been social distancing and staying at home as instructed. Patient does have a history of alcohol consumption and drug abuse. He reports that he smokes marijuana and cocaine, and reports that his last cocaine use was 3 days ago. The patient denies any other symptoms or relevant history at this time. The HPI was provided by the patient. REVIEW OF SYSTEMS      Review of Systems   Constitutional: Negative for activity change, appetite change, diaphoresis, fatigue, fever and unexpected weight change. HENT: Negative for congestion, ear discharge, ear pain, hearing loss, rhinorrhea, sinus pressure, sore throat, trouble swallowing and voice change. Eyes: Negative for photophobia, pain, discharge, redness and itching. Respiratory: Positive for cough and shortness of breath. Negative for choking, chest tightness and wheezing. Cardiovascular: Positive for chest pain. Negative for palpitations and leg swelling. Gastrointestinal: Negative for abdominal distention, abdominal pain, blood in stool, constipation, diarrhea, nausea and vomiting.    Endocrine: Heart Disease in his father and paternal grandmother; High Blood Pressure in his father, maternal grandfather, and maternal grandmother; Kidney Disease in his maternal grandmother; Obesity in his maternal grandfather; Other in his maternal grandfather; Stroke in his maternal grandmother. SOCIAL HISTORY    reports that he has been smoking cigarettes. He has been smoking about 1.50 packs per day. He has quit using smokeless tobacco. He reports current alcohol use. He reports current drug use. Frequency: 2.00 times per week. Drugs: Marijuana and Cocaine. PHYSICAL EXAM     INITIAL VITALS:  height is 5' 3\" (1.6 m) and weight is 214 lb (97.1 kg). His temperature is 99.1 °F (37.3 °C). His blood pressure is 107/60 and his pulse is 106. His respiration is 41 (abnormal) and oxygen saturation is 99%. Physical Exam  Vitals signs and nursing note reviewed. Constitutional:       General: He is not in acute distress. Appearance: He is well-developed. He is not diaphoretic. HENT:      Head: Normocephalic and atraumatic. Right Ear: External ear normal.      Left Ear: External ear normal.      Nose: Nose normal.   Eyes:      General: Lids are normal. No scleral icterus. Right eye: No discharge. Left eye: No discharge. Conjunctiva/sclera: Conjunctivae normal.      Right eye: No exudate. Left eye: No exudate. Pupils: Pupils are equal, round, and reactive to light. Neck:      Musculoskeletal: Normal range of motion and neck supple. Normal range of motion. Thyroid: No thyromegaly. Vascular: No JVD. Trachea: No tracheal deviation. Cardiovascular:      Rate and Rhythm: Regular rhythm. Tachycardia present. Pulses: Normal pulses. Heart sounds: Normal heart sounds, S1 normal and S2 normal. No murmur. No friction rub. No gallop. Pulmonary:      Effort: Pulmonary effort is normal. No respiratory distress. Breath sounds: No stridor.  Decreased breath sounds present. No wheezing or rales. Comments: Decreased breath sounds appreciated by auscultation. Cough noted during the exam.   Chest:      Chest wall: No tenderness. Abdominal:      General: Bowel sounds are normal. There is no distension. Palpations: Abdomen is soft. There is no mass. Tenderness: There is no abdominal tenderness. There is no guarding or rebound. Musculoskeletal: Normal range of motion. General: No tenderness. Right shoulder: He exhibits no tenderness, no bony tenderness, no crepitus and normal strength. Lymphadenopathy:      Cervical: No cervical adenopathy. Skin:     General: Skin is warm and dry. Findings: No bruising, ecchymosis, lesion or rash. Neurological:      Mental Status: He is alert and oriented to person, place, and time. Cranial Nerves: No cranial nerve deficit. Sensory: No sensory deficit. Coordination: Coordination normal.      Deep Tendon Reflexes: Reflexes are normal and symmetric. Psychiatric:         Speech: Speech normal.         Behavior: Behavior normal.         Thought Content: Thought content normal.         Judgment: Judgment normal.           DIFFERENTIAL DIAGNOSIS:   Differential diagnoses were discussed extensively with the patient and family including but no limited to asthma exacerbation, anxiety, substance abuse, sinusitis, URI, pneumonia, possible admission for a COVID-19 rule out. DIAGNOSTIC RESULTS     EKG: All EKG's are interpreted by the Emergency Department Physician who either signs or Co-signs this chart in the absence of a cardiologist.  EKG interpreted by Ana Perry DO:    EKG read and interpreted by myself and gives impression of sinus tachycardia with heart rate of 123; interval 136; QRS 84;QTc 449; axis 58, 39, 56. RADIOLOGY: non-plain film images(s) such as CT, Ultrasound and MRI are read by the radiologist.    CTA Chest W WO Contrast   Final Result      No acute pulmonary embolism. REFLEX TO CULTURE   LACTATE, SEPSIS   GLOMERULAR FILTRATION RATE, ESTIMATED   FERRITIN   COMPREHENSIVE METABOLIC PANEL W/ REFLEX TO MG FOR LOW K   EMERGENT DISEASE PANEL   TROPONIN       EMERGENCY DEPARTMENT COURSE:   Vitals:    Vitals:    04/01/20 0215 04/01/20 0241 04/01/20 0326 04/01/20 0350   BP: 83/64 104/85 (!) 106/56 107/60   Pulse: 113 114 119 106   Resp: (!) 39 29 (!) 42 (!) 41   Temp: 99.1 °F (37.3 °C)      TempSrc:       SpO2: 92% 99% 98% 99%   Weight:       Height:         12:28 AM EDT: The patient was seen andevaluated. Appropriate labs were ordered and reviewed. Patient was hypoxic when he came in. He was also coughing. The patient probably has a pneumonia he was tachycardic. His respiratory rate was elevated. The patient's ABG was within normal limits. CBC showed a white blood cell count of 22,000 with an absolute segmented neutrophil count of 19.4. He has an elevated procalcitonin of 1. Lactic acid is normal at 1.6. The patient's CRP is normal as well as his ferritin level. CT examination showed an atypical pneumonia with patchy infiltrates in the upper lung zones bilaterally and the middle and lower lung on the right. At this point the patient was started on Levaquin and azithromycin. I called the hospitalist service and discussed the case with them. They graciously accepted the admission. Patient is admitted in stable condition pending further evaluation and treatment. CRITICALCARE:   None    CONSULTS:  Hospitalist    PROCEDURES:  None    FINAL IMPRESSION      1.  Pneumonia due to organism          DISPOSITION/PLAN   Admission    PATIENT REFERRED TO:  MARY JANE Recio - CNP  [address] [place] [address], [address] [place] [address] Rd  [address]            DISCHARGE MEDICATIONS:  New Prescriptions    No medications on file       (Please note that portions of this note were completed with a voice recognition program.  Efforts weremade to edit the dictations but occasionally words are mis-transcribed.)    Scribe:  By signing my name below, Leila Stock, attest that this documentation has been prepared under the direction and in the presence of Deisi Woodward DO. Electronically Signed: Tayler Coreas, 4/1/20, 12:30 AM EDT.        Deisi Woodward DO  04/01/20 0856

## 2020-04-01 NOTE — PROGRESS NOTES
Pharmacy Note  Vancomycin Consult    Carmelo Green is a 22 y.o. male started on Vancomycin for r/o pneumonia; consult received from Dr. Daniela Ledezma to manage therapy. Also receiving the following antibiotics: rocephin and levaqui. Patient Active Problem List   Diagnosis    Shortness of breath       Allergies:  Latex and Penicillins     Temp max: 99.6    Recent Labs     04/01/20  0035   BUN 13       Recent Labs     04/01/20  0035   CREATININE 1.0       Recent Labs     04/01/20  0035   WBC 22.0*         Intake/Output Summary (Last 24 hours) at 4/1/2020 0358  Last data filed at 4/1/2020 0343  Gross per 24 hour   Intake 100 ml   Output --   Net 100 ml       Culture Date      Source                       Results  4/1/20                  blood                          pending  4/1/20                  throat                          pending      Ht Readings from Last 1 Encounters:   04/01/20 5' 3\" (1.6 m)        Wt Readings from Last 1 Encounters:   04/01/20 214 lb (97.1 kg)         Body mass index is 37.91 kg/m². Estimated Creatinine Clearance: 119 mL/min (based on SCr of 1 mg/dL). Goal Trough Level: 10-20mcg/mL    Assessment/Plan:  Will initiate vancomycin 1500 mg IV every 12 hours. Timing of trough level will be determined based on culture results, renal function, and clinical response. Thank you for the consult. Will continue to follow.

## 2020-04-01 NOTE — ED NOTES
First antibiotic started at this time. Will begin Zithromax upon completion of levaquin. Pt educated about medications. Both fluids and ATB infusing without complications.       Sakina Rincon RN  04/01/20 3255

## 2020-04-01 NOTE — ED NOTES
Pt given urinal. Pt output 550mL. Pt reports that he is coughing less after medication. VS updated. Attempted to call report to 4k at this time. No response.       Jose L Duval RN  04/01/20 1566

## 2020-04-01 NOTE — PLAN OF CARE
Problem: Falls - Risk of:  Goal: Will remain free from falls  Description: Will remain free from falls  Outcome: Ongoing  Note: Patient is up with assist, non skid slippers on, call light within reach, bed alarm on. Hourly rounding continued. Problem: Cardiovascular  Goal: No DVT, peripheral vascular complications  Outcome: Ongoing  Note: Patient wearing SCD's and receiving Lovenox injection subQ. No signs of DVT. Problem: Respiratory  Goal: No pulmonary complications  Outcome: Ongoing  Note: Patient has crackles in the lower bases bilaterally. Patients O2 95% on room air. Patient shows dyspnea with getting up to bathroom. Nonproductive cough. Aware of need for respiratory culture. Temperature 98.2 oral.   Problem: Discharge Planning  Intervention: Discharge to appropriate level of care  Note: Care plan reviewed with patient. Patient verbalizes understanding of the plan of care and contributes to goal setting.

## 2020-04-02 LAB
BASOPHILS # BLD: 0.5 %
BASOPHILS ABSOLUTE: 0.1 THOU/MM3 (ref 0–0.1)
EOSINOPHIL # BLD: 2.7 %
EOSINOPHILS ABSOLUTE: 0.3 THOU/MM3 (ref 0–0.4)
ERYTHROCYTE [DISTWIDTH] IN BLOOD BY AUTOMATED COUNT: 13 % (ref 11.5–14.5)
ERYTHROCYTE [DISTWIDTH] IN BLOOD BY AUTOMATED COUNT: 43.2 FL (ref 35–45)
HCT VFR BLD CALC: 40 % (ref 42–52)
HEMOGLOBIN: 13 GM/DL (ref 14–18)
IMMATURE GRANS (ABS): 0.02 THOU/MM3 (ref 0–0.07)
IMMATURE GRANULOCYTES: 0.2 %
LYMPHOCYTES # BLD: 38.7 %
LYMPHOCYTES ABSOLUTE: 4.5 THOU/MM3 (ref 1–4.8)
MCH RBC QN AUTO: 29.5 PG (ref 26–33)
MCHC RBC AUTO-ENTMCNC: 32.5 GM/DL (ref 32.2–35.5)
MCV RBC AUTO: 90.9 FL (ref 80–94)
MONOCYTES # BLD: 6.4 %
MONOCYTES ABSOLUTE: 0.7 THOU/MM3 (ref 0.4–1.3)
NUCLEATED RED BLOOD CELLS: 0 /100 WBC
PLATELET # BLD: 229 THOU/MM3 (ref 130–400)
PMV BLD AUTO: 9.7 FL (ref 9.4–12.4)
RBC # BLD: 4.4 MILL/MM3 (ref 4.7–6.1)
REASON FOR REJECTION: NORMAL
REJECTED TEST: NORMAL
SEG NEUTROPHILS: 51.5 %
SEGMENTED NEUTROPHILS ABSOLUTE COUNT: 6 THOU/MM3 (ref 1.8–7.7)
WBC # BLD: 11.6 THOU/MM3 (ref 4.8–10.8)

## 2020-04-02 PROCEDURE — 6370000000 HC RX 637 (ALT 250 FOR IP): Performed by: FAMILY MEDICINE

## 2020-04-02 PROCEDURE — 6360000002 HC RX W HCPCS: Performed by: FAMILY MEDICINE

## 2020-04-02 PROCEDURE — 85025 COMPLETE CBC W/AUTO DIFF WBC: CPT

## 2020-04-02 PROCEDURE — 2580000003 HC RX 258: Performed by: FAMILY MEDICINE

## 2020-04-02 PROCEDURE — 87205 SMEAR GRAM STAIN: CPT

## 2020-04-02 PROCEDURE — 36415 COLL VENOUS BLD VENIPUNCTURE: CPT

## 2020-04-02 PROCEDURE — 2060000000 HC ICU INTERMEDIATE R&B

## 2020-04-02 PROCEDURE — 99232 SBSQ HOSP IP/OBS MODERATE 35: CPT | Performed by: NURSE PRACTITIONER

## 2020-04-02 RX ADMIN — ENOXAPARIN SODIUM 40 MG: 40 INJECTION SUBCUTANEOUS at 09:25

## 2020-04-02 RX ADMIN — SODIUM CHLORIDE, PRESERVATIVE FREE 10 ML: 5 INJECTION INTRAVENOUS at 19:57

## 2020-04-02 RX ADMIN — LEVOFLOXACIN 750 MG: 5 INJECTION, SOLUTION INTRAVENOUS at 04:20

## 2020-04-02 RX ADMIN — SODIUM CHLORIDE, PRESERVATIVE FREE 10 ML: 5 INJECTION INTRAVENOUS at 09:25

## 2020-04-02 RX ADMIN — Medication 10 ML: at 04:21

## 2020-04-02 ASSESSMENT — PAIN SCALES - GENERAL
PAINLEVEL_OUTOF10: 0

## 2020-04-02 NOTE — PROGRESS NOTES
[] SCDs                                 [] SQ Heparin                                 [] Encourage ambulation           [] Already on Anticoagulation     Code Status: Full Code    Tele:   [x] yes SR HR 84             [] no    Active Hospital Problems    Diagnosis Date Noted    Shortness of breath [R06.02] 04/01/2020       Electronically signed by MARY JANE Marino CNP on 4/2/2020 at 3:13 AM

## 2020-04-03 LAB
BLOOD CULTURE, ROUTINE: ABNORMAL
EMERGENT DISEASE RESULT: NORMAL
MRSA SCREEN: NORMAL
ORGANISM: ABNORMAL
SARS-COV-2: NOT DETECTED
STREP PNEUMO AG, UR: NEGATIVE
THROAT/NOSE CULTURE: NORMAL

## 2020-04-03 PROCEDURE — 2580000003 HC RX 258: Performed by: FAMILY MEDICINE

## 2020-04-03 PROCEDURE — 6370000000 HC RX 637 (ALT 250 FOR IP): Performed by: FAMILY MEDICINE

## 2020-04-03 PROCEDURE — 6360000002 HC RX W HCPCS: Performed by: FAMILY MEDICINE

## 2020-04-03 PROCEDURE — 1200000000 HC SEMI PRIVATE

## 2020-04-03 PROCEDURE — 99232 SBSQ HOSP IP/OBS MODERATE 35: CPT | Performed by: FAMILY MEDICINE

## 2020-04-03 RX ADMIN — SODIUM CHLORIDE, PRESERVATIVE FREE 10 ML: 5 INJECTION INTRAVENOUS at 19:47

## 2020-04-03 RX ADMIN — SODIUM CHLORIDE, PRESERVATIVE FREE 10 ML: 5 INJECTION INTRAVENOUS at 08:49

## 2020-04-03 RX ADMIN — ENOXAPARIN SODIUM 40 MG: 40 INJECTION SUBCUTANEOUS at 08:48

## 2020-04-03 RX ADMIN — LEVOFLOXACIN 750 MG: 5 INJECTION, SOLUTION INTRAVENOUS at 04:58

## 2020-04-03 ASSESSMENT — PAIN SCALES - GENERAL
PAINLEVEL_OUTOF10: 0

## 2020-04-03 NOTE — PROGRESS NOTES
Hospitalist Progress Note    Patient:  Darylene Boozer      Unit/Bed:4K-24/024-A    YOB: 1995    MRN: [id number]       Acct: [de-identified]     PCP: MARY JANE Monique CNP    Date of Admission: 4/1/2020    Assessment/Plan:    1. Sepsis with tachycardia, tachypnea, now Gram (+) cocci bacteremia, 2nd to interstitial pneumonia (POA)--Levaquin 4/1 --> ?change to rocephin but with ; afebrile, on room air -- ?ID cs  -- Possible COVID 19--emergent panel ordered 4/1  (p) as of 4/3/2020   -- CTA chest 4/1 (-) PE, Bilateral groundglass infiltrates predominantly in the upper lobes and more mildly in the right middle and lower lobes, with a more central distribution, indeterminate for COVID-19 pneumonia  -- TTE vs JOYA when covid testing returns   2. Bilateral pneumonia  -- POA -- GRam (+) most likely vs viral -- on RA - CTA chest 4/1 = (-) PE, Bilateral groundglass infiltrates predominantly in the upper lobes and more mildly in the right middle and lower lobes, with a more central distribution, indeterminate for COVID-19 pneumonia  -- ?related to +cocaine abuse  -- ?related to strep mitis/orals in blood but ?? Contrib to pneumonia  -- COVID emergent panel 4/1   3. Acute Hypoxic Respiratory failure--resolved; on RA - CTA chest 4/1 = (-) PE, Bilateral groundglass infiltrates predominantly in the upper lobes and more mildly in the right middle and lower lobes, with a more central distribution, indeterminate for COVID-19 pneumonia -- levaquin 4/1   -- ?asthma exac  4. Acute Chest Pain--ACS ruled out by 2 (-) troponins, CTA chest 4/1 (-) PE but with #1, 2  5. Asthma w/o exac -- no current wheezing -- cont prn albuterol  6. Polysubstance abuse with cocaine, marijuana -- counseled on cessation  7. Enlarged main pulmonary artery suggestive of pulmonary arterial hypertension--may need further work up   8. Bipolar 2 d/o/depression/ anxiety  9.  Tobacco abuse - cont patch - cont  on cessation     Dispo  -- 4/3 --> states feeling better. No cp, sob, no n/v/f/c. No abd pain. Jacques po. On RA. Afebrile. Denies sob with exertion. Medications:  Reviewed    Infusion Medications   Scheduled Medications    sodium chloride flush  10 mL Intravenous 2 times per day    enoxaparin  40 mg Subcutaneous Daily    levofloxacin  750 mg Intravenous Q24H    nicotine  1 patch Transdermal Daily     PRN Meds: sodium chloride flush, acetaminophen **OR** acetaminophen, albuterol sulfate HFA, perflutren lipid microspheres, ondansetron      Intake/Output Summary (Last 24 hours) at 4/3/2020 0455  Last data filed at 4/2/2020 2200  Gross per 24 hour   Intake 1110 ml   Output --   Net 1110 ml       Diet:  DIET GENERAL;    Exam:  BP (!) 95/53   Pulse 81   Temp 98.6 °F (37 °C) (Oral)   Resp 16   Ht 5' 3\" (1.6 m)   Wt 214 lb (97.1 kg)   SpO2 91%   BMI 37.91 kg/m²     General appearance: No apparent distress, appears stated age and cooperative. HEENT: Pupils equal, round, and reactive to light. Conjunctivae/corneas clear. Neck: Supple, with full range of motion. No jugular venous distention. Trachea midline. Respiratory:  Normal respiratory effort. Clear to auscultation, bilaterally without Rales/Wheezes/Rhonchi. Cardiovascular: Regular rate and rhythm with normal S1/S2 without murmurs, rubs or gallops. Abdomen: Soft, non-tender, non-distended with normal bowel sounds. Musculoskeletal: passive and active ROM x 4 extremities. Skin: Skin color, texture, turgor normal.    Neurologic:  Neurovascularly intact without any focal sensory/motor deficits.  Cranial nerves: II-XII intact, grossly non-focal.  Psychiatric: Alert and oriented, thought content appropriate  Capillary Refill: Brisk,< 3 seconds   Peripheral Pulses: +2 palpable, equal bilaterally       Labs:   Recent Labs     04/01/20 0035 04/02/20  0619   WBC 22.0* 11.6*   HGB 15.8 13.0*   HCT 46.7 40.0*    229     Recent Labs     04/01/20  0035 04/01/20  0553   * 135   K 4.7 4.6   CL 95* 99   CO2 22* 25   BUN 13 11   CREATININE 1.0 0.8   CALCIUM 8.5 8.1*     Recent Labs     04/01/20  0035 04/01/20  0553   AST 24 18   ALT 12 10*   BILIDIR <0.2  --    BILITOT 0.4 0.4   ALKPHOS 78 63     No results for input(s): INR in the last 72 hours. No results for input(s): Thena Gourd in the last 72 hours. Recent Labs     04/01/20  0043   PROCAL 1.04*       Microbiology:      Urinalysis:      Lab Results   Component Value Date    NITRU NEGATIVE 04/01/2020    WBCUA 0-2 06/21/2015    BACTERIA NONE 06/21/2015    RBCUA 0-2 06/21/2015    BLOODU NEGATIVE 04/01/2020    SPECGRAV >=1.030 06/21/2015    GLUCOSEU NEGATIVE 04/01/2020       Radiology:  CTA Chest W WO Contrast   Final Result      No acute pulmonary embolism. Bilateral groundglass infiltrates predominantly in the upper lobes and more mildly in the right middle and lower lobes, with a more central distribution, indeterminate for COVID-19 pneumonia. Enlarged main pulmonary artery suggestive of pulmonary arterial hypertension. **This report has been created using voice recognition software. It may contain minor errors which are inherent in voice recognition technology. **      Final report electronically signed by Dr. David Christensen on 4/1/2020 2:31 AM          DVT prophylaxis: [x] Lovenox                                 [] SCDs                                 [] SQ Heparin                                 [] Encourage ambulation           [] Already on Anticoagulation     Code Status: Full Code    Tele:   [x] yes SR HR 84             [] no        Electronically signed by Chastity Dickson MD on 4/3/2020 at 4:55 AM

## 2020-04-03 NOTE — FLOWSHEET NOTE
04/02/20 2104   Encounter Summary   Services provided to: Patient   Referral/Consult From: Multi-disciplinary team   Support System Significant other;Parent   Continue Visiting Yes  (4/2 P)   Complexity of Encounter Moderate   Length of Encounter 15 minutes   Spiritual Assessment Completed Yes   Spiritual/Taoist   Type Spiritual support   Assessment Approachable   Intervention Explored feelings, thoughts, concerns;Nurtured hope   Outcome Receptive;Encouraged;Expressed gratitude     Assessment: The patient is a 25-yr-old male who entered care for pneumonia. This staff offered support and a willingness to reach out to his family. He shared he has a girlfriend at this time. He felt his contact with her was enough and he didn't want to worry his family anymore tonight. - We talked about his care and if there are any needs spiritually. He felt confident he was fine in that area as well.      Plan: Support, via phone calls, can be offered to the patient during his stay in the hospital.

## 2020-04-03 NOTE — PROGRESS NOTES
Pt stated felt little queasy earlier no emesis.  Had felt gasy earlier passing a lot gas no no bm.up in room sitting in chair no SOB now feeling better

## 2020-04-04 LAB
ANION GAP SERPL CALCULATED.3IONS-SCNC: 8 MEQ/L (ref 8–16)
BUN BLDV-MCNC: 10 MG/DL (ref 7–22)
CALCIUM SERPL-MCNC: 8.8 MG/DL (ref 8.5–10.5)
CHLORIDE BLD-SCNC: 104 MEQ/L (ref 98–111)
CO2: 29 MEQ/L (ref 23–33)
CREAT SERPL-MCNC: 0.9 MG/DL (ref 0.4–1.2)
ERYTHROCYTE [DISTWIDTH] IN BLOOD BY AUTOMATED COUNT: 12.7 % (ref 11.5–14.5)
ERYTHROCYTE [DISTWIDTH] IN BLOOD BY AUTOMATED COUNT: 41.5 FL (ref 35–45)
GFR SERPL CREATININE-BSD FRML MDRD: > 90 ML/MIN/1.73M2
GLUCOSE BLD-MCNC: 100 MG/DL (ref 70–108)
HCT VFR BLD CALC: 42.3 % (ref 42–52)
HEMOGLOBIN: 14 GM/DL (ref 14–18)
LV EF: 43 %
LVEF MODALITY: NORMAL
MCH RBC QN AUTO: 29.4 PG (ref 26–33)
MCHC RBC AUTO-ENTMCNC: 33.1 GM/DL (ref 32.2–35.5)
MCV RBC AUTO: 88.9 FL (ref 80–94)
PLATELET # BLD: 290 THOU/MM3 (ref 130–400)
PMV BLD AUTO: 9.7 FL (ref 9.4–12.4)
POTASSIUM SERPL-SCNC: 4.5 MEQ/L (ref 3.5–5.2)
PROCALCITONIN: 0.43 NG/ML (ref 0.01–0.09)
RBC # BLD: 4.76 MILL/MM3 (ref 4.7–6.1)
SODIUM BLD-SCNC: 141 MEQ/L (ref 135–145)
WBC # BLD: 8.7 THOU/MM3 (ref 4.8–10.8)

## 2020-04-04 PROCEDURE — 87040 BLOOD CULTURE FOR BACTERIA: CPT

## 2020-04-04 PROCEDURE — 6370000000 HC RX 637 (ALT 250 FOR IP): Performed by: FAMILY MEDICINE

## 2020-04-04 PROCEDURE — 85027 COMPLETE CBC AUTOMATED: CPT

## 2020-04-04 PROCEDURE — 2709999900 HC NON-CHARGEABLE SUPPLY

## 2020-04-04 PROCEDURE — 6360000002 HC RX W HCPCS: Performed by: PHYSICIAN ASSISTANT

## 2020-04-04 PROCEDURE — 80048 BASIC METABOLIC PNL TOTAL CA: CPT

## 2020-04-04 PROCEDURE — 36415 COLL VENOUS BLD VENIPUNCTURE: CPT

## 2020-04-04 PROCEDURE — 99232 SBSQ HOSP IP/OBS MODERATE 35: CPT | Performed by: PHYSICIAN ASSISTANT

## 2020-04-04 PROCEDURE — 84145 PROCALCITONIN (PCT): CPT

## 2020-04-04 PROCEDURE — 1200000000 HC SEMI PRIVATE

## 2020-04-04 PROCEDURE — 6360000002 HC RX W HCPCS: Performed by: FAMILY MEDICINE

## 2020-04-04 PROCEDURE — 93306 TTE W/DOPPLER COMPLETE: CPT

## 2020-04-04 PROCEDURE — 2580000003 HC RX 258: Performed by: FAMILY MEDICINE

## 2020-04-04 PROCEDURE — 94760 N-INVAS EAR/PLS OXIMETRY 1: CPT

## 2020-04-04 RX ORDER — CEFAZOLIN SODIUM 1 G/50ML
1 INJECTION, SOLUTION INTRAVENOUS EVERY 8 HOURS
Status: DISCONTINUED | OUTPATIENT
Start: 2020-04-04 | End: 2020-04-05

## 2020-04-04 RX ADMIN — CEFAZOLIN SODIUM 1 G: 1 INJECTION, SOLUTION INTRAVENOUS at 17:52

## 2020-04-04 RX ADMIN — SODIUM CHLORIDE, PRESERVATIVE FREE 10 ML: 5 INJECTION INTRAVENOUS at 21:00

## 2020-04-04 RX ADMIN — ENOXAPARIN SODIUM 40 MG: 40 INJECTION SUBCUTANEOUS at 09:50

## 2020-04-04 RX ADMIN — CEFAZOLIN SODIUM 1 G: 1 INJECTION, SOLUTION INTRAVENOUS at 10:42

## 2020-04-04 RX ADMIN — SODIUM CHLORIDE, PRESERVATIVE FREE 10 ML: 5 INJECTION INTRAVENOUS at 09:50

## 2020-04-04 NOTE — PROGRESS NOTES
Patient wearing mask and transferred by wheelchair with patient belonging to [facility]. Patient transferred from room to k elevators by [facility]. Patient transferred to clean wheelchair in k elevator with [address].

## 2020-04-04 NOTE — PROGRESS NOTES
Hospitalist Progress Note      Patient:  Susanne Harris    Unit/Bed:5K-12/012-A  YOB: 1995  MRN: [id number]   Acct: [de-identified]   PCP: MARY JANE Huston CNP  Date of Admission: 4/1/2020    Assessment/Plan:    1. Sepsis with tachycardia, tachypnea, now Gram (+) cocci bacteremia, secondary  to interstitial pneumonia (POA): CTA chest 4/1 (-) PE, Bilateral groundglass infiltrates predominantly in the upper lobes and more mildly in the right middle and lower lobes, with a more central distribution, indeterminate for COVID-19 pneumonia. Started on Levaquin 4/1 --> changed to Ancef as cx showed Streptococcus oralis/streptococcus mitis. Repeat blood cx x2. ECHO completed today, results pending. Ruled out COVID 19--emergent panel ordered 4/1 -> negative on 4/3/2020. ECHO. Sepsis resolved, VSS. 2. Bilateral pneumonia: POA , gram (+) most likely vs viral. On RA - CTA chest 4/1 shows  (-) PE. Bilateral groundglass infiltrates predominantly in the upper lobes and more mildly in the right middle and lower lobes, with a more central distribution, indeterminate for COVID-19 pneumonia. 3. Acute Hypoxic Respiratory failure: above vs asthma exacerbation? Resolved pt now on RA - CTA chest 4/1 showed (-) PE. Bilateral groundglass infiltrates predominantly in the upper lobes and more mildly in the right middle and lower lobes, with a more central distribution, indeterminate for COVID-19 pneumonia started on levaquin 4/1.   4. Acute Chest Pain--ACS ruled out by 2 negative troponins. CTA chest 4/1 was (-) PE but with #1, 2  5. Asthma w/o exac -- no current wheezing -- cont prn albuterol. 6. Polysubstance abuse with cocaine, marijuana -- counseled on cessation. 7. Enlarged main pulmonary artery suggestive of pulmonary arterial hypertension: ECHO done today, await results. C/s cardiology? 8. Bipolar 2/depression/ anxiety: no home therapies listed   9.  Tobacco abuse reviewed again and is unchanged since admission. ROS (12 point review of systems completed. Pertinent positives noted. Otherwise ROS is negative)     Medications:  Reviewed    Infusion Medications   Scheduled Medications    ceFAZolin  1 g Intravenous Q8H    sodium chloride flush  10 mL Intravenous 2 times per day    enoxaparin  40 mg Subcutaneous Daily    nicotine  1 patch Transdermal Daily     PRN Meds: sodium chloride flush, acetaminophen **OR** acetaminophen, albuterol sulfate HFA, perflutren lipid microspheres, ondansetron      Intake/Output Summary (Last 24 hours) at 4/4/2020 0913  Last data filed at 4/3/2020 1952  Gross per 24 hour   Intake 1330 ml   Output --   Net 1330 ml       Diet:  DIET GENERAL;    Exam:  BP (!) 113/59   Pulse 69   Temp 97.7 °F (36.5 °C) (Oral)   Resp 18   Ht 5' 3\" (1.6 m)   Wt 210 lb 9.6 oz (95.5 kg)   SpO2 97%   BMI 37.31 kg/m²   General appearance: No apparent distress, appears stated age and cooperative. HEENT: Pupils equal, round, and reactive to light. Conjunctivae/corneas clear. Neck: Supple, with full range of motion. No jugular venous distention. Trachea midline. Respiratory:  Normal respiratory effort. Diminished to auscultation, bilaterally without Rales/Wheezes/Rhonchi. Cardiovascular: Regular rate and rhythm with normal S1/S2 without murmurs, rubs or gallops. Abdomen: Soft, non-tender, non-distended with normal bowel sounds. Musculoskeletal: passive and active ROM x 4 extremities. Skin: Skin color, texture, turgor normal.  No rashes or lesions. Neurologic:  Neurovascularly intact without any focal sensory/motor deficits.  Cranial nerves: II-XII intact, grossly non-focal.  Psychiatric: Alert and oriented, thought content appropriate, normal insight  Capillary Refill: Brisk,< 3 seconds   Peripheral Pulses: +2 palpable, equal bilaterally     Labs:   Recent Labs     04/02/20  0619 04/04/20  0543   WBC 11.6* 8.7   HGB 13.0* 14.0   HCT 40.0* 42.3    290

## 2020-04-05 LAB
ANION GAP SERPL CALCULATED.3IONS-SCNC: 12 MEQ/L (ref 8–16)
BUN BLDV-MCNC: 11 MG/DL (ref 7–22)
CALCIUM SERPL-MCNC: 8.4 MG/DL (ref 8.5–10.5)
CHLORIDE BLD-SCNC: 103 MEQ/L (ref 98–111)
CO2: 24 MEQ/L (ref 23–33)
CREAT SERPL-MCNC: 0.9 MG/DL (ref 0.4–1.2)
ERYTHROCYTE [DISTWIDTH] IN BLOOD BY AUTOMATED COUNT: 12.6 % (ref 11.5–14.5)
ERYTHROCYTE [DISTWIDTH] IN BLOOD BY AUTOMATED COUNT: 41.1 FL (ref 35–45)
GFR SERPL CREATININE-BSD FRML MDRD: > 90 ML/MIN/1.73M2
GLUCOSE BLD-MCNC: 102 MG/DL (ref 70–108)
HCT VFR BLD CALC: 44.3 % (ref 42–52)
HEMOGLOBIN: 14.4 GM/DL (ref 14–18)
MCH RBC QN AUTO: 29 PG (ref 26–33)
MCHC RBC AUTO-ENTMCNC: 32.5 GM/DL (ref 32.2–35.5)
MCV RBC AUTO: 89.1 FL (ref 80–94)
PLATELET # BLD: 290 THOU/MM3 (ref 130–400)
PMV BLD AUTO: 9.3 FL (ref 9.4–12.4)
POTASSIUM SERPL-SCNC: 3.8 MEQ/L (ref 3.5–5.2)
PRO-BNP: 56.3 PG/ML (ref 0–450)
PROCALCITONIN: 0.25 NG/ML (ref 0.01–0.09)
RBC # BLD: 4.97 MILL/MM3 (ref 4.7–6.1)
SODIUM BLD-SCNC: 139 MEQ/L (ref 135–145)
WBC # BLD: 8.6 THOU/MM3 (ref 4.8–10.8)

## 2020-04-05 PROCEDURE — 85027 COMPLETE CBC AUTOMATED: CPT

## 2020-04-05 PROCEDURE — 6370000000 HC RX 637 (ALT 250 FOR IP): Performed by: PHYSICIAN ASSISTANT

## 2020-04-05 PROCEDURE — 1200000000 HC SEMI PRIVATE

## 2020-04-05 PROCEDURE — 2709999900 HC NON-CHARGEABLE SUPPLY

## 2020-04-05 PROCEDURE — 83880 ASSAY OF NATRIURETIC PEPTIDE: CPT

## 2020-04-05 PROCEDURE — 2580000003 HC RX 258: Performed by: FAMILY MEDICINE

## 2020-04-05 PROCEDURE — 80048 BASIC METABOLIC PNL TOTAL CA: CPT

## 2020-04-05 PROCEDURE — 94760 N-INVAS EAR/PLS OXIMETRY 1: CPT

## 2020-04-05 PROCEDURE — 84145 PROCALCITONIN (PCT): CPT

## 2020-04-05 PROCEDURE — 99232 SBSQ HOSP IP/OBS MODERATE 35: CPT | Performed by: PHYSICIAN ASSISTANT

## 2020-04-05 PROCEDURE — 6360000002 HC RX W HCPCS: Performed by: FAMILY MEDICINE

## 2020-04-05 PROCEDURE — 6360000002 HC RX W HCPCS: Performed by: PHYSICIAN ASSISTANT

## 2020-04-05 PROCEDURE — 6370000000 HC RX 637 (ALT 250 FOR IP): Performed by: FAMILY MEDICINE

## 2020-04-05 PROCEDURE — 36415 COLL VENOUS BLD VENIPUNCTURE: CPT

## 2020-04-05 RX ORDER — CEFDINIR 300 MG/1
300 CAPSULE ORAL EVERY 12 HOURS SCHEDULED
Status: DISCONTINUED | OUTPATIENT
Start: 2020-04-05 | End: 2020-04-06 | Stop reason: HOSPADM

## 2020-04-05 RX ORDER — DOXYCYCLINE HYCLATE 100 MG
100 TABLET ORAL EVERY 12 HOURS SCHEDULED
Status: DISCONTINUED | OUTPATIENT
Start: 2020-04-05 | End: 2020-04-06 | Stop reason: HOSPADM

## 2020-04-05 RX ADMIN — CEFAZOLIN SODIUM 1 G: 1 INJECTION, SOLUTION INTRAVENOUS at 02:51

## 2020-04-05 RX ADMIN — DOXYCYCLINE HYCLATE 100 MG: 100 TABLET, COATED ORAL at 20:45

## 2020-04-05 RX ADMIN — ENOXAPARIN SODIUM 40 MG: 40 INJECTION SUBCUTANEOUS at 10:22

## 2020-04-05 RX ADMIN — SODIUM CHLORIDE, PRESERVATIVE FREE 10 ML: 5 INJECTION INTRAVENOUS at 20:44

## 2020-04-05 RX ADMIN — CEFDINIR 300 MG: 300 CAPSULE ORAL at 20:45

## 2020-04-05 RX ADMIN — CEFAZOLIN SODIUM 1 G: 1 INJECTION, SOLUTION INTRAVENOUS at 10:21

## 2020-04-05 RX ADMIN — SODIUM CHLORIDE, PRESERVATIVE FREE 10 ML: 5 INJECTION INTRAVENOUS at 10:22

## 2020-04-05 ASSESSMENT — PAIN SCALES - GENERAL
PAINLEVEL_OUTOF10: 0
PAINLEVEL_OUTOF10: 0

## 2020-04-05 NOTE — PROGRESS NOTES
Regular rate and rhythm with normal S1/S2 without murmurs, rubs or gallops. Abdomen: Soft, non-tender, non-distended with normal bowel sounds. Musculoskeletal: passive and active ROM x 4 extremities. Skin: Skin color, texture, turgor normal.  No rashes or lesions. Neurologic:  Neurovascularly intact without any focal sensory/motor deficits. Cranial nerves: II-XII intact, grossly non-focal.  Psychiatric: Alert and oriented, thought content appropriate, normal insight  Capillary Refill: Brisk,< 3 seconds   Peripheral Pulses: +2 palpable, equal bilaterally     Labs:   Recent Labs     04/04/20 0543 04/05/20  0511   WBC 8.7 8.6   HGB 14.0 14.4   HCT 42.3 44.3    290     Recent Labs     04/04/20 0543 04/05/20 0511    139   K 4.5 3.8    103   CO2 29 24   BUN 10 11   CREATININE 0.9 0.9   CALCIUM 8.8 8.4*     No results for input(s): AST, ALT, BILIDIR, BILITOT, ALKPHOS in the last 72 hours. No results for input(s): INR in the last 72 hours. No results for input(s): Eddye Lipschutz in the last 72 hours. Microbiology:    Blood culture #1:   Lab Results   Component Value Date    BC No growth-preliminary  04/01/2020       Blood culture #2:No results found for: Mitchell Dao    Organism:  Lab Results   Component Value Date    ORG Streptococcus mitis/Streptococcus oralis 04/01/2020       No results found for: LABGRAM    MRSA culture only:No results found for: [facility]    Urine culture:   Lab Results   Component Value Date    Delle Risen  06/21/2015     Growth of Contaminants. The mixture of organisms present  are not a common cause of urinary tract infections and  probably represent skin kari or distal urethral kari.          Respiratory culture: No results found for: CULTRESP    Aerobic and Anaerobic :  No results found for: LABAERO  No results found for: LABANAE    Urinalysis:      Lab Results   Component Value Date    NITRU NEGATIVE 04/01/2020    WBCUA 0-2 06/21/2015    BACTERIA NONE 06/21/2015

## 2020-04-05 NOTE — PLAN OF CARE
Problem: Falls - Risk of:  Goal: Will remain free from falls  Description: Will remain free from falls  Outcome: Ongoing  Goal: Absence of physical injury  Description: Absence of physical injury  Outcome: Ongoing     Problem: Cardiovascular  Goal: No DVT, peripheral vascular complications  Outcome: Ongoing     Problem: Respiratory  Goal: No pulmonary complications  Outcome: Ongoing  Goal: O2 Sat > 90%  Outcome: Ongoing  Goal: Supplemental O2 requirements decreased  Outcome: Ongoing  Goal: Agreement to quit smoking  Outcome: Ongoing

## 2020-04-06 VITALS
BODY MASS INDEX: 37.32 KG/M2 | WEIGHT: 210.6 LBS | HEIGHT: 63 IN | TEMPERATURE: 97.5 F | SYSTOLIC BLOOD PRESSURE: 103 MMHG | RESPIRATION RATE: 16 BRPM | DIASTOLIC BLOOD PRESSURE: 52 MMHG | OXYGEN SATURATION: 97 % | HEART RATE: 65 BPM

## 2020-04-06 LAB
ANION GAP SERPL CALCULATED.3IONS-SCNC: 10 MEQ/L (ref 8–16)
BLOOD CULTURE, ROUTINE: NORMAL
BUN BLDV-MCNC: 10 MG/DL (ref 7–22)
CALCIUM IONIZED: 1.14 MMOL/L (ref 1.12–1.32)
CALCIUM SERPL-MCNC: 8.8 MG/DL (ref 8.5–10.5)
CHLORIDE BLD-SCNC: 102 MEQ/L (ref 98–111)
CO2: 27 MEQ/L (ref 23–33)
CREAT SERPL-MCNC: 0.8 MG/DL (ref 0.4–1.2)
ERYTHROCYTE [DISTWIDTH] IN BLOOD BY AUTOMATED COUNT: 12.4 % (ref 11.5–14.5)
ERYTHROCYTE [DISTWIDTH] IN BLOOD BY AUTOMATED COUNT: 40.1 FL (ref 35–45)
GFR SERPL CREATININE-BSD FRML MDRD: > 90 ML/MIN/1.73M2
GLUCOSE BLD-MCNC: 94 MG/DL (ref 70–108)
HCT VFR BLD CALC: 42.7 % (ref 42–52)
HEMOGLOBIN: 14.1 GM/DL (ref 14–18)
MCH RBC QN AUTO: 29.1 PG (ref 26–33)
MCHC RBC AUTO-ENTMCNC: 33 GM/DL (ref 32.2–35.5)
MCV RBC AUTO: 88 FL (ref 80–94)
PLATELET # BLD: 304 THOU/MM3 (ref 130–400)
PMV BLD AUTO: 9.2 FL (ref 9.4–12.4)
POTASSIUM SERPL-SCNC: 3.9 MEQ/L (ref 3.5–5.2)
RBC # BLD: 4.85 MILL/MM3 (ref 4.7–6.1)
SODIUM BLD-SCNC: 139 MEQ/L (ref 135–145)
WBC # BLD: 10.1 THOU/MM3 (ref 4.8–10.8)

## 2020-04-06 PROCEDURE — 94760 N-INVAS EAR/PLS OXIMETRY 1: CPT

## 2020-04-06 PROCEDURE — 36415 COLL VENOUS BLD VENIPUNCTURE: CPT

## 2020-04-06 PROCEDURE — 82330 ASSAY OF CALCIUM: CPT

## 2020-04-06 PROCEDURE — 99238 HOSP IP/OBS DSCHRG MGMT 30/<: CPT | Performed by: PHYSICIAN ASSISTANT

## 2020-04-06 PROCEDURE — 2709999900 HC NON-CHARGEABLE SUPPLY

## 2020-04-06 PROCEDURE — 80048 BASIC METABOLIC PNL TOTAL CA: CPT

## 2020-04-06 PROCEDURE — 6370000000 HC RX 637 (ALT 250 FOR IP): Performed by: PHYSICIAN ASSISTANT

## 2020-04-06 PROCEDURE — 6370000000 HC RX 637 (ALT 250 FOR IP): Performed by: FAMILY MEDICINE

## 2020-04-06 PROCEDURE — 2580000003 HC RX 258: Performed by: FAMILY MEDICINE

## 2020-04-06 PROCEDURE — 99223 1ST HOSP IP/OBS HIGH 75: CPT | Performed by: INTERNAL MEDICINE

## 2020-04-06 PROCEDURE — 85027 COMPLETE CBC AUTOMATED: CPT

## 2020-04-06 RX ORDER — NICOTINE 21 MG/24HR
1 PATCH, TRANSDERMAL 24 HOURS TRANSDERMAL DAILY
Qty: 30 PATCH | Refills: 3 | Status: SHIPPED | OUTPATIENT
Start: 2020-04-07 | End: 2020-04-21

## 2020-04-06 RX ORDER — CEFDINIR 300 MG/1
300 CAPSULE ORAL EVERY 12 HOURS SCHEDULED
Qty: 4 CAPSULE | Refills: 0 | Status: SHIPPED | OUTPATIENT
Start: 2020-04-06 | End: 2020-04-08

## 2020-04-06 RX ORDER — DOXYCYCLINE HYCLATE 100 MG
100 TABLET ORAL EVERY 12 HOURS SCHEDULED
Qty: 4 TABLET | Refills: 0 | Status: SHIPPED | OUTPATIENT
Start: 2020-04-06 | End: 2020-04-08

## 2020-04-06 RX ADMIN — CEFDINIR 300 MG: 300 CAPSULE ORAL at 10:24

## 2020-04-06 RX ADMIN — DOXYCYCLINE HYCLATE 100 MG: 100 TABLET, COATED ORAL at 10:24

## 2020-04-06 RX ADMIN — SODIUM CHLORIDE, PRESERVATIVE FREE 10 ML: 5 INJECTION INTRAVENOUS at 10:27

## 2020-04-06 ASSESSMENT — PAIN SCALES - GENERAL: PAINLEVEL_OUTOF10: 0

## 2020-04-06 NOTE — CONSULTS
Dose Route Frequency Provider Last Rate Last Dose    cefdinir (OMNICEF) capsule 300 mg  300 mg Oral 2 times per day [facility]   300 mg at 04/06/20 1024    doxycycline hyclate (VIBRA-TABS) tablet 100 mg  100 mg Oral 2 times per day [facility]   100 mg at 04/06/20 1024    sodium chloride flush 0.9 % injection 10 mL  10 mL Intravenous 2 times per day Dionicio Martínez MD   10 mL at 04/06/20 1027    sodium chloride flush 0.9 % injection 10 mL  10 mL Intravenous PRN Ciara Moffett MD   10 mL at 04/02/20 0421    acetaminophen (TYLENOL) tablet 650 mg  650 mg Oral Q6H PRN Ciara Moffett MD   650 mg at 04/01/20 1750    Or    acetaminophen (TYLENOL) suppository 650 mg  650 mg Rectal Q6H PRN Ciara Moffett MD        enoxaparin (LOVENOX) injection 40 mg  40 mg Subcutaneous Daily Ciara Moffett MD   40 mg at 04/05/20 1022    albuterol sulfate  (90 Base) MCG/ACT inhaler 2 puff  2 puff Inhalation Q6H PRN Ciara Moffett MD        perflutren lipid microspheres (DEFINITY) injection 1.65 mg  1.5 mL Intravenous ONCE PRN Ciara Moffett MD        ondansetron (ZOFRAN) injection 4 mg  4 mg Intravenous Q6H PRN [place]        nicotine (NICODERM CQ) 21 MG/24HR 1 patch  1 patch Transdermal Daily Gabriela Acosta MD   1 patch at 04/06/20 1024       Allergies:  Latex and Penicillins    Social History:    TOBACCO:   reports that he has been smoking cigarettes. He has been smoking about 1.50 packs per day. He has quit using smokeless tobacco.  ETOH:   reports current alcohol use.       Family History:        Problem Relation Age of Onset    Bipolar Disorder Mother     COPD Mother     Heart Disease Father     High Blood Pressure Father     Cancer Maternal Grandmother         Uterine Cancer    High Blood Pressure Maternal Grandmother     Stroke Maternal Grandmother     Kidney Disease Maternal Grandmother     Diabetes Maternal Grandfather     High Blood

## 2020-04-06 NOTE — CARE COORDINATION
4/2/20, 7:40 AM EDT    DISCHARGE ON GOING Patti Levy 104 day: 1  Location: -24/024-A Reason for admit: Shortness of breath [R06.02]      04/01 CTA chest  No acute pulmonary embolism. Bilateral groundglass infiltrates predominantly in the upper lobes and more mildly in the right middle and lower lobes, with a more central distribution, indeterminate for COVID-19 pneumonia. Enlarged main pulmonary artery suggestive of pulmonary arterial hypertension. Treatment Plan of Care: blood culture  1/2 + strep, continue Levaquin IV daily, O2 1L/min with sat 93% and then weaned off, droplet + isolation, Covid-19 testing in process/collected 04/01, med nebs, afebrile. Barriers to Discharge: sepsis treatment/medical stability    PCP: MARY JANE Decker CNP  Readmission Risk Score: 6%      Patient Goals/Plan/Treatment Preferences: planning home with parents; follow for any needs.
4/6/20, 12:27 PM EDT    DISCHARGE ON GOING Patti Levy 104 day: 5  Location: -12/012-A Reason for admit: Shortness of breath [R06.02]   Procedure: N/A  Treatment Plan of Care: Patient transferred to  from 4. He was negative for Co-Vid. Discharge orders on chart. Barriers to Discharge: N/A  PCP: MARY JNAE Butler CNP  Readmission Risk Score: 6%  Patient Goals/Plan/Treatment Preferences: Home residing with his parents. No needs. 4/6/20, 12:28 PM EDT    Patient goals/plan/ treatment preferences discussed by  and . Patient goals/plan/ treatment preferences reviewed with patient/ family. Patient/ family verbalize understanding of discharge plan and are in agreement with goal/plan/treatment preferences. Understanding was demonstrated using the teach back method. AVS provided by RN at time of discharge, which includes all necessary medical information pertaining to the patients current course of illness, treatment, post-discharge goals of care, and treatment preferences.
Security/Housekeeping Addressed:  No issues identified.     Evaluation: no

## 2020-04-06 NOTE — DISCHARGE SUMMARY
BP.  4. Acute Hypoxic Respiratory failure: above vs asthma exacerbation? Resolved pt now on RA - CTA chest 4/1 showed (-) PE. Bilateral groundglass infiltrates predominantly in the upper lobes and more mildly in the right middle and lower lobes, with a more central distribution, indeterminate for COVID-19 pneumonia started on levaquin 4/1. Resolved. 4. Acute Chest Pain--ACS ruled out by 2 negative troponins. CTA chest 4/1 was (-) PE but with #1, 2  5. Asthma w/o exac -- no current wheezing -- cont prn albuterol. 6. Polysubstance abuse with cocaine, marijuana -- counseled on cessation. 7. Enlarged main pulmonary artery suggestive of pulmonary arterial hypertension: ECHO done today, shows There was mild tricuspid regurgitation. Assuming RAP = 5 mmHg, the estimated RVSP = 35 mmHg - Cardiology discussed with pt, follow up in 1-2 weeks with Cardiology clinic. 8. Bipolar 2/depression/ anxiety: no home therapies listed   9. Tobacco abuse :cont patch - cont  on cessation     Initial H and P and Hospital course:-  \"\"Lowell Garcia is a 22year old white male smoker with PMH of Anxiety, Asthma, Bipolar, and HTN who presents to [facility] ED on 3/30/20 c/o SOB that started acutely an hour just prior to admission that was associated with sharp, stabbing CP that radiated to the lower back. He also notes associated cough that started at the same time that is minimally productive with yellow sputum. Patient states that he has had episodes of the CP in the past but it has never lasted this long. The symptoms started while he was sitting down watching the television. He states that his mother was recently sick with a cold, and he has no recent travel. Patient notes recent congestion. Patient smokes marijuana and uses cocaine but states his last drug use was 3 days ago.  Denies V/D, abdominal pain, arm/shoulder/jaw pain, lightheadedness, fever,      In the ED, vitals showed: , /69, RR 18, tmax 99.6, 96% on 2L via [facility] filed at 4/6/2020 0426  Gross per 24 hour   Intake 1210 ml   Output --   Net 1210 ml       1. General appearance: No apparent distress, appears stated age and cooperative. 2. HEENT: Normal cephalic, atraumatic without obvious deformity. Pupils equal, round, and reactive to light. Extra ocular muscles intact. Conjunctivae/corneas clear. 3. Neck: Supple, with full range of motion. No jugular venous distention. Trachea midline. 4. Respiratory:  Normal respiratory effort. Clear to auscultation, bilaterally without Rales/Wheezes/Rhonchi. 5. Cardiovascular: Regular rate and rhythm with normal S1/S2 without murmurs, rubs or gallops. 6. Abdomen: Soft, non-tender, non-distended with normal bowel sounds. 7. Musculoskeletal:  No clubbing, cyanosis or edema bilaterally. 8. Skin: Skin color, texture, turgor normal.  No rashes or lesions. 9. Neurologic:  Neurovascularly intact without any focal sensory/motor deficits. Cranial nerves: II-XII intact, grossly non-focal.  10. Psychiatric: Alert and oriented, thought content appropriate, normal insight  11. Capillary Refill: Brisk,< 3 seconds   12. Peripheral Pulses: +2 palpable, equal bilaterally       Discharge Medications:-      Medication List      START taking these medications    nicotine 21 MG/24HR  Commonly known as:  [address] [facility] 1 patch onto the skin daily  Start taking on:  April 7, 2020        CONTINUE taking these medications    albuterol sulfate  (90 Base) MCG/ACT inhaler  Commonly known as:  Proventil HFA  Inhale 2 puffs into the lungs every 6 hours as needed for Wheezing     fluticasone propionate 100 MCG/BLIST Aepb inhaler  Commonly known as:  Flovent Diskus  Take 1 puff 2 times daily. Rinse mouth after using.      ibuprofen 200 MG tablet  Commonly known as:  ADVIL;MOTRIN     Lumbar Back Brace/Support Pad Misc  1 each by Does not apply route daily        STOP taking these medications    omeprazole 40 MG delayed release

## 2020-04-07 ENCOUNTER — CARE COORDINATION (OUTPATIENT)
Dept: CASE MANAGEMENT | Age: 25
End: 2020-04-07

## 2020-04-07 ENCOUNTER — TELEPHONE (OUTPATIENT)
Dept: FAMILY MEDICINE CLINIC | Age: 25
End: 2020-04-07

## 2020-04-07 NOTE — TELEPHONE ENCOUNTER
[facility] Transitions Initial Follow Up Call    Outreach made within 2 business days of discharge: Yes    Patient: Facundo De Los Santos Patient : 1995   MRN: [id number]  Reason for Admission: There are no discharge diagnoses documented for the most recent discharge. Discharge Date: 20       Spoke with: Ifeanyi Garcia    Discharge department/facility: [facility]    TCM Interactive Patient Contact:  Was patient able to fill all prescriptions: Yes  Was patient instructed to bring all medications to the follow-up visit: Yes  Is patient taking all medications as directed in the discharge summary?  Yes  Does patient understand their discharge instructions: Yes  Does patient have questions or concerns that need addressed prior to 7-14 day follow up office visit: no    Scheduled appointment with PCP within 7-14 days    Follow Up  Future Appointments   Date Time Provider Nunu Baker   4/15/2020  2:30 [facility]   2020 11:15 AM Maggie Barnes MD [address] [place] [address]

## 2020-04-10 LAB
BLOOD CULTURE, ROUTINE: NORMAL
BLOOD CULTURE, ROUTINE: NORMAL

## 2020-04-21 ENCOUNTER — TELEPHONE (OUTPATIENT)
Dept: CARDIOLOGY CLINIC | Age: 25
End: 2020-04-21

## 2020-04-21 ENCOUNTER — CARE COORDINATION (OUTPATIENT)
Dept: CASE MANAGEMENT | Age: 25
End: 2020-04-21

## 2020-04-21 NOTE — TELEPHONE ENCOUNTER
Pre-VV Call:    Spoke with patient's mother regarding VV on 4-22-20 with Dr. Marla Melton. Patient's mother verified medication list and will notify patient he needs to obtain 3 vital signs. Pt mother confirmed method of contact for doxy link.

## 2020-04-21 NOTE — CARE COORDINATION
Patient contacted regarding COVID-19 risk and screening. Care Transition Nurse/ Ambulatory Care Manager contacted the patient by telephone to perform follow-up assessment. Verified name and  with patient as identifiers. Patient has following risk factors of: COPD and acute respiratory failure. Symptoms reviewed with patient who verbalized the following symptoms: no new symptoms. Due to no new or worsening symptoms encounter was not routed to provider for escalation. Education provided regarding infection prevention, and signs and symptoms of COVID-19 and when to seek medical attention with patient who verbalized understanding. Discussed exposure protocols and quarantine from [address] you at higher risk for severe illness  and given an opportunity for questions and concerns. The patient agrees to contact the COVID-19 hotline 127-639-4522 or PCP office for questions related to their healthcare. CTN/ACM provided contact information for future reference. From CDC: Are you at higher risk for severe illness?  Wash your hands often.  Avoid close contact (6 feet, which is about two arm lengths) with people who are sick.  Put distance between yourself and other people if COVID-19 is spreading in your community.  Clean and disinfect frequently touched surfaces.  Avoid all cruise travel and non-essential air travel.  Call your healthcare professional if you have concerns about COVID-19 and your underlying condition or if you are sick. For more information on steps you can take to protect yourself, see CDC's How to Protect Yourself      Patient declines any concerns or issues at this time.

## 2020-07-02 ENCOUNTER — HOSPITAL ENCOUNTER (EMERGENCY)
Age: 25
Discharge: HOME | End: 2020-07-02
Attending: EMERGENCY MEDICINE
Payer: COMMERCIAL

## 2020-07-02 VITALS
DIASTOLIC BLOOD PRESSURE: 69 MMHG | BODY MASS INDEX: 40.04 KG/M2 | HEART RATE: 82 BPM | OXYGEN SATURATION: 98 % | HEIGHT: 63 IN | WEIGHT: 226 LBS | RESPIRATION RATE: 16 BRPM | SYSTOLIC BLOOD PRESSURE: 126 MMHG | TEMPERATURE: 98.4 F

## 2020-07-02 PROCEDURE — 6370000000 HC RX 637 (ALT 250 FOR IP): Performed by: EMERGENCY MEDICINE

## 2020-07-02 PROCEDURE — 99281 EMR DPT VST MAYX REQ PHY/QHP: CPT

## 2020-07-02 RX ORDER — PREDNISONE 20 MG/1
60 TABLET ORAL ONCE
Status: COMPLETED | OUTPATIENT
Start: 2020-07-02 | End: 2020-07-02

## 2020-07-02 RX ORDER — PREDNISONE 20 MG/1
20 TABLET ORAL 2 TIMES DAILY
Qty: 10 TABLET | Refills: 0 | Status: SHIPPED | OUTPATIENT
Start: 2020-07-03 | End: 2020-07-08

## 2020-07-02 RX ADMIN — PREDNISONE 60 MG: 20 TABLET ORAL at 15:42

## 2020-07-02 ASSESSMENT — ENCOUNTER SYMPTOMS
RHINORRHEA: 0
CHEST TIGHTNESS: 0
COLOR CHANGE: 0
COUGH: 0
SHORTNESS OF BREATH: 0
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
## 2020-07-06 ENCOUNTER — TELEPHONE (OUTPATIENT)
Dept: FAMILY MEDICINE CLINIC | Age: 25
End: 2020-07-06

## 2020-07-06 NOTE — LETTER
July 6, 2020    [facility] [address]    Dear Rufus Cervantes,    This letter is regarding your Emergency Department (ED) visit at [address] [address] on 7/2/20. Pastor La wanted to make sure that you understand your discharge instructions and that you were able to fill any prescriptions that may have been ordered for you. Please contact the office at the above phone number if the ED advised you to follow up with Pastor La, or if you have any further questions or needs. Also did you know -   *Visiting the ED for a non-emergency could result in higher co-pays than you would normally be subject to paying? [facility]) practices can often offer you an appointment on the same day that you call for acute issues. *We have some [facility] offices that offer Walk-in appointments; check our website for availability in your community, [email or web address].      *Evisits are now available for patients for through [address] If you do not have MyChart and are interested, please contact the office and a staff member may assist you or go to [email or web address].     Sincerely,   MARY JANE Navarro CNP and your [facility]

## 2020-11-02 ENCOUNTER — TELEPHONE (OUTPATIENT)
Dept: FAMILY MEDICINE CLINIC | Age: 25
End: 2020-11-02

## 2020-11-02 NOTE — TELEPHONE ENCOUNTER
Pt called stating his live in girlfriend went to ED for diabetic concerns was tested for COVID and it was positive. Pt's employer [employer] states pt MUST have a COVID test done before he can return to work. Pt is asymptomatic at this time. Pt would like a call back with advice.

## 2021-04-19 ENCOUNTER — HOSPITAL ENCOUNTER (EMERGENCY)
Age: 26
Discharge: HOME | End: 2021-04-19
Attending: EMERGENCY MEDICINE
Payer: COMMERCIAL

## 2021-04-19 ENCOUNTER — APPOINTMENT (OUTPATIENT)
Dept: GENERAL RADIOLOGY | Age: 26
End: 2021-04-19
Payer: COMMERCIAL

## 2021-04-19 ENCOUNTER — TELEPHONE (OUTPATIENT)
Dept: FAMILY MEDICINE CLINIC | Age: 26
End: 2021-04-19

## 2021-04-19 VITALS
HEART RATE: 98 BPM | TEMPERATURE: 99 F | HEIGHT: 63 IN | OXYGEN SATURATION: 99 % | WEIGHT: 260 LBS | RESPIRATION RATE: 16 BRPM | BODY MASS INDEX: 46.07 KG/M2 | SYSTOLIC BLOOD PRESSURE: 152 MMHG | DIASTOLIC BLOOD PRESSURE: 93 MMHG

## 2021-04-19 DIAGNOSIS — K21.9 GASTROESOPHAGEAL REFLUX DISEASE WITHOUT ESOPHAGITIS: ICD-10-CM

## 2021-04-19 DIAGNOSIS — R07.89 ATYPICAL CHEST PAIN: Primary | ICD-10-CM

## 2021-04-19 LAB
ANION GAP SERPL CALCULATED.3IONS-SCNC: 11 MEQ/L (ref 8–16)
BASOPHILS # BLD: 0.5 %
BASOPHILS ABSOLUTE: 0 THOU/MM3 (ref 0–0.1)
BUN BLDV-MCNC: 13 MG/DL (ref 7–22)
CALCIUM SERPL-MCNC: 9.3 MG/DL (ref 8.5–10.5)
CHLORIDE BLD-SCNC: 103 MEQ/L (ref 98–111)
CO2: 26 MEQ/L (ref 23–33)
CREAT SERPL-MCNC: 0.8 MG/DL (ref 0.4–1.2)
EKG ATRIAL RATE: 100 BPM
EKG P AXIS: 46 DEGREES
EKG P-R INTERVAL: 152 MS
EKG Q-T INTERVAL: 338 MS
EKG QRS DURATION: 88 MS
EKG QTC CALCULATION (BAZETT): 436 MS
EKG R AXIS: 37 DEGREES
EKG T AXIS: 44 DEGREES
EKG VENTRICULAR RATE: 100 BPM
EOSINOPHIL # BLD: 3.4 %
EOSINOPHILS ABSOLUTE: 0.3 THOU/MM3 (ref 0–0.4)
ERYTHROCYTE [DISTWIDTH] IN BLOOD BY AUTOMATED COUNT: 13.1 % (ref 11.5–14.5)
ERYTHROCYTE [DISTWIDTH] IN BLOOD BY AUTOMATED COUNT: 41.7 FL (ref 35–45)
GFR SERPL CREATININE-BSD FRML MDRD: > 90 ML/MIN/1.73M2
GLUCOSE BLD-MCNC: 89 MG/DL (ref 70–108)
HCT VFR BLD CALC: 45 % (ref 42–52)
HEMOGLOBIN: 14.8 GM/DL (ref 14–18)
IMMATURE GRANS (ABS): 0.01 THOU/MM3 (ref 0–0.07)
IMMATURE GRANULOCYTES: 0.1 %
LYMPHOCYTES # BLD: 51 %
LYMPHOCYTES ABSOLUTE: 4.8 THOU/MM3 (ref 1–4.8)
MCH RBC QN AUTO: 28.6 PG (ref 26–33)
MCHC RBC AUTO-ENTMCNC: 32.9 GM/DL (ref 32.2–35.5)
MCV RBC AUTO: 87 FL (ref 80–94)
MONOCYTES # BLD: 6.7 %
MONOCYTES ABSOLUTE: 0.6 THOU/MM3 (ref 0.4–1.3)
NUCLEATED RED BLOOD CELLS: 0 /100 WBC
OSMOLALITY CALCULATION: 279 MOSMOL/KG (ref 275–300)
PLATELET # BLD: 255 THOU/MM3 (ref 130–400)
PMV BLD AUTO: 9.2 FL (ref 9.4–12.4)
POTASSIUM REFLEX MAGNESIUM: 4.6 MEQ/L (ref 3.5–5.2)
RBC # BLD: 5.17 MILL/MM3 (ref 4.7–6.1)
SEG NEUTROPHILS: 38.3 %
SEGMENTED NEUTROPHILS ABSOLUTE COUNT: 3.6 THOU/MM3 (ref 1.8–7.7)
SODIUM BLD-SCNC: 140 MEQ/L (ref 135–145)
TROPONIN T: < 0.01 NG/ML
WBC # BLD: 9.4 THOU/MM3 (ref 4.8–10.8)

## 2021-04-19 PROCEDURE — 93010 ELECTROCARDIOGRAM REPORT: CPT | Performed by: INTERNAL MEDICINE

## 2021-04-19 PROCEDURE — 99285 EMERGENCY DEPT VISIT HI MDM: CPT | Performed by: EMERGENCY MEDICINE

## 2021-04-19 PROCEDURE — 6370000000 HC RX 637 (ALT 250 FOR IP): Performed by: EMERGENCY MEDICINE

## 2021-04-19 PROCEDURE — 93005 ELECTROCARDIOGRAM TRACING: CPT | Performed by: EMERGENCY MEDICINE

## 2021-04-19 PROCEDURE — 80048 BASIC METABOLIC PNL TOTAL CA: CPT

## 2021-04-19 PROCEDURE — 85025 COMPLETE CBC W/AUTO DIFF WBC: CPT

## 2021-04-19 PROCEDURE — 84484 ASSAY OF TROPONIN QUANT: CPT

## 2021-04-19 PROCEDURE — 36415 COLL VENOUS BLD VENIPUNCTURE: CPT

## 2021-04-19 PROCEDURE — 71045 X-RAY EXAM CHEST 1 VIEW: CPT

## 2021-04-19 PROCEDURE — 94640 AIRWAY INHALATION TREATMENT: CPT

## 2021-04-19 RX ORDER — OMEPRAZOLE 40 MG/1
40 CAPSULE, DELAYED RELEASE ORAL
Qty: 30 CAPSULE | Refills: 0 | Status: SHIPPED | OUTPATIENT
Start: 2021-04-19

## 2021-04-19 RX ORDER — IPRATROPIUM BROMIDE AND ALBUTEROL SULFATE 2.5; .5 MG/3ML; MG/3ML
1 SOLUTION RESPIRATORY (INHALATION) ONCE
Status: COMPLETED | OUTPATIENT
Start: 2021-04-19 | End: 2021-04-19

## 2021-04-19 RX ORDER — PANTOPRAZOLE SODIUM 40 MG/1
40 TABLET, DELAYED RELEASE ORAL ONCE
Status: COMPLETED | OUTPATIENT
Start: 2021-04-19 | End: 2021-04-19

## 2021-04-19 RX ORDER — ONDANSETRON 4 MG/1
4 TABLET, ORALLY DISINTEGRATING ORAL ONCE
Status: COMPLETED | OUTPATIENT
Start: 2021-04-19 | End: 2021-04-19

## 2021-04-19 RX ORDER — CALCIUM CARBONATE 200(500)MG
1 TABLET,CHEWABLE ORAL DAILY
Qty: 30 TABLET | Refills: 0 | Status: SHIPPED | OUTPATIENT
Start: 2021-04-19 | End: 2021-05-19

## 2021-04-19 RX ADMIN — LIDOCAINE HYDROCHLORIDE: 20 SOLUTION ORAL; TOPICAL at 02:36

## 2021-04-19 RX ADMIN — IPRATROPIUM BROMIDE AND ALBUTEROL SULFATE 1 AMPULE: .5; 3 SOLUTION RESPIRATORY (INHALATION) at 02:46

## 2021-04-19 RX ADMIN — IPRATROPIUM BROMIDE AND ALBUTEROL SULFATE 1 AMPULE: .5; 3 SOLUTION RESPIRATORY (INHALATION) at 02:41

## 2021-04-19 RX ADMIN — ONDANSETRON 4 MG: 4 TABLET, ORALLY DISINTEGRATING ORAL at 02:36

## 2021-04-19 RX ADMIN — PANTOPRAZOLE SODIUM 40 MG: 40 TABLET, DELAYED RELEASE ORAL at 02:36

## 2021-04-19 ASSESSMENT — PAIN DESCRIPTION - FREQUENCY: FREQUENCY: CONTINUOUS

## 2021-04-19 ASSESSMENT — PAIN DESCRIPTION - LOCATION: LOCATION: CHEST

## 2021-04-19 ASSESSMENT — ENCOUNTER SYMPTOMS
CHOKING: 0
CONSTIPATION: 0
PHOTOPHOBIA: 0
COUGH: 0
TROUBLE SWALLOWING: 0
VOMITING: 0
CHEST TIGHTNESS: 0
EYE ITCHING: 0
SORE THROAT: 0
NAUSEA: 0
RHINORRHEA: 0
EYE PAIN: 0
BACK PAIN: 0
VOICE CHANGE: 0
EYE DISCHARGE: 0
ABDOMINAL PAIN: 0
SHORTNESS OF BREATH: 1
EYE REDNESS: 0
ABDOMINAL DISTENTION: 0
DIARRHEA: 0
WHEEZING: 0
SINUS PRESSURE: 0
BLOOD IN STOOL: 0

## 2021-04-19 ASSESSMENT — PAIN DESCRIPTION - DESCRIPTORS: DESCRIPTORS: SHARP

## 2021-04-19 ASSESSMENT — PAIN DESCRIPTION - PAIN TYPE: TYPE: ACUTE PAIN

## 2021-04-19 NOTE — ED NOTES
Pt to ED from work for complaint of chest pain, nausea, dizziness that began at approximately 0145. Pt denies fall or LOC at this time. Pt states he does not think he has had adequate intake of water recently and states drinking large Monster energy drink yesterday. Pt states history of anxiety at this time. No distress noted at this time, pt breaths easy and unlabored.        [facility]  04/19/21 2202

## 2021-04-19 NOTE — LETTER
Olvinramonjose Jimenezgina  [facility], [address]  Phone: [phone]  Fax: [phone]     April 19, 2021    [address]    Dear Estephania Sellers,    Thank you for choosing our [facility] on 4/19/21. Your Provider wanted to make sure that you understand your discharge instructions and that you were able to fill any prescriptions that may have been ordered for you. Please contact the office at the above phone number if you were advised to follow up with your Provider, or if you have any further questions or needs. Also did you know -                            [facility] practices can often offer you an appointment on the same day that you call for acute issues. *We have some [facility] offices that offer Walk-in appointments; check our website for availability in your community, [email or web address].      *Evisits are now available for patients through [address] [facility]) also offers video visits through [address] If you do not have MyChart and are interested, please contact the office and a staff member may assist you or go to [email or web address].       Sincerely,     MARY JANE Dawson CNP and your [facility]

## 2021-04-19 NOTE — ED NOTES
Pt to restroom and back with no difficulty. No distress noted at this time, pt states he \"feels much better at breathing treatment\" at this time. Call light left within reach, pt breaths easy and unlabored.        Lorrie, [facility]  04/19/21 8449

## 2021-04-19 NOTE — ED PROVIDER NOTES
sinus pressure, sore throat, trouble swallowing and voice change. Eyes: Negative for photophobia, pain, discharge, redness and itching. Respiratory: Positive for shortness of breath. Negative for cough, choking, chest tightness and wheezing. Cardiovascular: Positive for chest pain. Negative for palpitations and leg swelling. Gastrointestinal: Negative for abdominal distention, abdominal pain, blood in stool, constipation, diarrhea, nausea and vomiting. Endocrine: Negative for polydipsia, polyphagia and polyuria. Genitourinary: Negative for decreased urine volume, difficulty urinating, dysuria, enuresis, frequency, hematuria and urgency. Musculoskeletal: Negative for arthralgias, back pain, gait problem, myalgias, neck pain and neck stiffness. Skin: Negative for pallor and rash. Allergic/Immunologic: Negative for immunocompromised state. Neurological: Negative for dizziness, tremors, seizures, syncope, facial asymmetry, weakness, light-headedness, numbness and headaches. Hematological: Negative for adenopathy. Does not bruise/bleed easily. Psychiatric/Behavioral: Negative for agitation, hallucinations and suicidal ideas. The patient is not nervous/anxious. PAST MEDICAL HISTORY    has a past medical history of Anxiety, Asthma, Bipolar 2 disorder ([address] Depression, and Hypertension. SURGICAL HISTORY      has a past surgical history that includes Tympanostomy tube placement. CURRENT MEDICATIONS       Previous Medications    ALBUTEROL SULFATE HFA (PROVENTIL HFA) 108 (90 BASE) MCG/ACT INHALER    Inhale 2 puffs into the lungs every 6 hours as needed for Wheezing    ELASTIC BANDAGES & SUPPORTS (LUMBAR BACK BRACE/SUPPORT PAD) MISC    1 each by Does not apply route daily    FLUTICASONE PROPIONATE (FLOVENT DISKUS) 100 MCG/BLIST AEPB INHALER    Take 1 puff 2 times daily. Rinse mouth after using.     IBUPROFEN (ADVIL;MOTRIN) 200 MG TABLET    Take 400 mg by mouth every 6 hours as needed for Pain       ALLERGIES     is allergic to latex and penicillins. FAMILY HISTORY     He indicated that his mother is alive. He indicated that his father is alive. He indicated that his maternal grandmother is . He indicated that his maternal grandfather is . He indicated that his paternal grandmother is alive. He indicated that the status of his paternal grandfather is unknown.   family history includes Bipolar Disorder in his mother; COPD in his mother and paternal grandmother; Cancer in his maternal grandmother; Diabetes in his maternal grandfather; Heart Disease in his father and paternal grandmother; High Blood Pressure in his father, maternal grandfather, and maternal grandmother; Kidney Disease in his maternal grandmother; Obesity in his maternal grandfather; Other in his maternal grandfather; Stroke in his maternal grandmother. SOCIAL HISTORY      reports that he has been smoking cigarettes. He has been smoking about 1.50 packs per day. He has quit using smokeless tobacco. He reports current alcohol use. He reports current drug use. Frequency: 2.00 times per week. Drugs: Marijuana and Cocaine. PHYSICAL EXAM     INITIAL VITALS:  height is 5' 3\" (1.6 m) and weight is 260 lb (117.9 kg). His oral temperature is 99 °F (37.2 °C). His blood pressure is 152/93 (abnormal) and his pulse is 98. His respiration is 16 and oxygen saturation is 99%. Physical Exam  Vitals signs and nursing note reviewed. Constitutional:       General: He is not in acute distress. Appearance: He is well-developed. He is not diaphoretic. HENT:      Head: Normocephalic and atraumatic. Right Ear: External ear normal.      Left Ear: External ear normal.      Nose: Nose normal.   Eyes:      General: Lids are normal. No scleral icterus. Right eye: No discharge. Left eye: No discharge. Conjunctiva/sclera: Conjunctivae normal.      Right eye: No exudate. Left eye: No exudate. Pupils: Pupils are equal, round, and reactive to light. Neck:      Musculoskeletal: Normal range of motion and neck supple. Normal range of motion. Thyroid: No thyromegaly. Vascular: No JVD. Trachea: No tracheal deviation. Cardiovascular:      Rate and Rhythm: Normal rate and regular rhythm. Pulses: Normal pulses. Carotid pulses are 2+ on the right side and 2+ on the left side. Radial pulses are 2+ on the right side and 2+ on the left side. Femoral pulses are 2+ on the right side and 2+ on the left side. Popliteal pulses are 2+ on the right side and 2+ on the left side. Dorsalis pedis pulses are 2+ on the right side and 2+ on the left side. Posterior tibial pulses are 2+ on the right side and 2+ on the left side. Heart sounds: Normal heart sounds, S1 normal and S2 normal. No murmur. No friction rub. No gallop. Pulmonary:      Effort: Pulmonary effort is normal. No respiratory distress. Breath sounds: Normal breath sounds. No stridor. No decreased breath sounds, wheezing, rhonchi or rales. Chest:      Chest wall: No tenderness. Abdominal:      General: Bowel sounds are normal. There is no distension. Palpations: Abdomen is soft. There is no mass. Tenderness: There is no abdominal tenderness. There is no guarding or rebound. Negative signs include Pruitt's sign, Rovsing's sign, McBurney's sign, psoas sign and obturator sign. Hernia: No hernia is present. Musculoskeletal: Normal range of motion. General: No tenderness. Right lower leg: No edema. Left lower leg: No edema. Lymphadenopathy:      Cervical: No cervical adenopathy. Skin:     General: Skin is warm and dry. Capillary Refill: Capillary refill takes less than 2 seconds. Findings: No bruising, ecchymosis, lesion or rash. Neurological:      General: No focal deficit present.       Mental Status: He is alert and oriented to person, place, and time. Mental status is at baseline. Cranial Nerves: No cranial nerve deficit. Sensory: No sensory deficit. Motor: No weakness. Coordination: Coordination normal.      Gait: Gait normal.      Deep Tendon Reflexes: Reflexes are normal and symmetric. Psychiatric:         Speech: Speech normal.         Behavior: Behavior normal.         Thought Content: Thought content normal.         Judgment: Judgment normal.           DIFFERENTIAL DIAGNOSIS:   Gastritis gastroenteritis asthma less likely ACS    DIAGNOSTIC RESULTS     EKG: All EKG's are interpreted by the Emergency Department Physician who either signs or Co-signs this chart in the absence of a cardiologist.  EKG revealed normal sinus rhythm, normal axis, ventricular rate 100 TX interval 152 QRS duration of 88 QT interval 338 QTC of 436. When compared with EKG dated April 1, 2020 no significant changes appreciated. RADIOLOGY: non-plain film images(s) such as CT, Ultrasound and MRI are read by the radiologist.  XR CHEST PORTABLE   Final Result   Impression:   No acute pathology. This document has been electronically signed by: Starr Diez MD on    04/19/2021 02:57 AM            LABS:   Labs Reviewed   CBC WITH AUTO DIFFERENTIAL - Abnormal; Notable for the following components:       Result Value    MPV 9.2 (*)     All other components within normal limits   BASIC METABOLIC PANEL W/ REFLEX TO MG FOR LOW K   TROPONIN   OSMOLALITY   ANION GAP   GLOMERULAR FILTRATION RATE, ESTIMATED       EMERGENCY DEPARTMENT COURSE:   Vitals:    Vitals:    04/19/21 0209 04/19/21 0241 04/19/21 0325   BP: (!) 157/102  (!) 152/93   Pulse: 93  98   Resp: 16 16 16   Temp: 99 °F (37.2 °C)     TempSrc: Oral     SpO2: 100% 100% 99%   Weight: 260 lb (117.9 kg)     Height: 5' 3\" (1.6 m)       Patient was assessed at bedside appropriate labs and imaging were ordered. I think that this is GERD.   Patient is given Zofran Protonix and GI cocktail at bedside. He also had some minor wheezing so we gave him 2 DuoNeb treatments. EKG was within normal limits. I reviewed all labs and imaging with back to reassess the patient. He is feeling much better. At this point I feel the patient be safely discharged home. I feel his atypical chest pain is due to his reflux disease. Patient will be given medications for his reflux disease he is instructed to take those as prescribed and follow-up with his primary care physician. Patient understood and agreed with the plan. Patient is subsequently discharged home in good condition. CRITICAL CARE:   None    CONSULTS:  None     PROCEDURES:  None    FINAL IMPRESSION      1. Atypical chest pain    2.  Gastroesophageal reflux disease without esophagitis          DISPOSITION/PLAN   Discharge    PATIENT REFERRED TO:  Pao Deleon, APRN - CNP  [facility], [address] [place] [address]  [facility]  [phone]    Call in 1 day        DISCHARGE MEDICATIONS:  New Prescriptions    CALCIUM CARBONATE (ANTACID) 500 MG CHEWABLE TABLET    Take 1 tablet by mouth daily    OMEPRAZOLE (PRILOSEC) 40 MG DELAYED RELEASE CAPSULE    Take 1 capsule by mouth every morning (before breakfast)       (Please note that portions of this note were completed with a voice recognition program.  Efforts were made to edit the dictations but occasionally words are mis-transcribed.)    Mariusz Lincoln, [address] [facility],   04/19/21 1998

## 2021-11-03 ENCOUNTER — APPOINTMENT (OUTPATIENT)
Dept: CT IMAGING | Age: 26
End: 2021-11-03
Payer: COMMERCIAL

## 2021-11-03 ENCOUNTER — APPOINTMENT (OUTPATIENT)
Dept: GENERAL RADIOLOGY | Age: 26
End: 2021-11-03
Payer: COMMERCIAL

## 2021-11-03 ENCOUNTER — HOSPITAL ENCOUNTER (EMERGENCY)
Age: 26
Discharge: HOME | End: 2021-11-03
Payer: COMMERCIAL

## 2021-11-03 VITALS
DIASTOLIC BLOOD PRESSURE: 83 MMHG | HEIGHT: 62 IN | BODY MASS INDEX: 46.01 KG/M2 | TEMPERATURE: 100.7 F | SYSTOLIC BLOOD PRESSURE: 153 MMHG | RESPIRATION RATE: 18 BRPM | WEIGHT: 250 LBS | HEART RATE: 110 BPM | OXYGEN SATURATION: 93 %

## 2021-11-03 DIAGNOSIS — J45.30 MILD PERSISTENT ASTHMA WITHOUT COMPLICATION: ICD-10-CM

## 2021-11-03 DIAGNOSIS — U07.1 COVID-19: Primary | ICD-10-CM

## 2021-11-03 LAB
ALBUMIN SERPL-MCNC: 4.5 G/DL (ref 3.5–5.1)
ALP BLD-CCNC: 104 U/L (ref 38–126)
ALT SERPL-CCNC: 10 U/L (ref 11–66)
ANION GAP SERPL CALCULATED.3IONS-SCNC: 14 MEQ/L (ref 8–16)
AST SERPL-CCNC: 27 U/L (ref 5–40)
BASOPHILS # BLD: 0.3 %
BASOPHILS ABSOLUTE: 0 THOU/MM3 (ref 0–0.1)
BILIRUB SERPL-MCNC: 0.4 MG/DL (ref 0.3–1.2)
BUN BLDV-MCNC: 16 MG/DL (ref 7–22)
C-REACTIVE PROTEIN: 2.37 MG/DL (ref 0–1)
CALCIUM SERPL-MCNC: 8.5 MG/DL (ref 8.5–10.5)
CHLORIDE BLD-SCNC: 99 MEQ/L (ref 98–111)
CO2: 25 MEQ/L (ref 23–33)
CREAT SERPL-MCNC: 0.9 MG/DL (ref 0.4–1.2)
D-DIMER QUANTITATIVE: 731 NG/ML FEU (ref 0–500)
EOSINOPHIL # BLD: 1.3 %
EOSINOPHILS ABSOLUTE: 0.1 THOU/MM3 (ref 0–0.4)
ERYTHROCYTE [DISTWIDTH] IN BLOOD BY AUTOMATED COUNT: 13.2 % (ref 11.5–14.5)
ERYTHROCYTE [DISTWIDTH] IN BLOOD BY AUTOMATED COUNT: 43.2 FL (ref 35–45)
GFR SERPL CREATININE-BSD FRML MDRD: > 90 ML/MIN/1.73M2
GLUCOSE BLD-MCNC: 93 MG/DL (ref 70–108)
HCT VFR BLD CALC: 45.9 % (ref 42–52)
HEMOGLOBIN: 15.2 GM/DL (ref 14–18)
IMMATURE GRANS (ABS): 0.02 THOU/MM3 (ref 0–0.07)
IMMATURE GRANULOCYTES: 0.3 %
LYMPHOCYTES # BLD: 48.6 %
LYMPHOCYTES ABSOLUTE: 3.5 THOU/MM3 (ref 1–4.8)
MCH RBC QN AUTO: 29.2 PG (ref 26–33)
MCHC RBC AUTO-ENTMCNC: 33.1 GM/DL (ref 32.2–35.5)
MCV RBC AUTO: 88.3 FL (ref 80–94)
MONOCYTES # BLD: 6.6 %
MONOCYTES ABSOLUTE: 0.5 THOU/MM3 (ref 0.4–1.3)
NUCLEATED RED BLOOD CELLS: 0 /100 WBC
OSMOLALITY CALCULATION: 276.6 MOSMOL/KG (ref 275–300)
PLATELET # BLD: 164 THOU/MM3 (ref 130–400)
PMV BLD AUTO: 9.5 FL (ref 9.4–12.4)
POTASSIUM REFLEX MAGNESIUM: 4.4 MEQ/L (ref 3.5–5.2)
RBC # BLD: 5.2 MILL/MM3 (ref 4.7–6.1)
SARS-COV-2, NAAT: DETECTED
SEG NEUTROPHILS: 42.9 %
SEGMENTED NEUTROPHILS ABSOLUTE COUNT: 3 THOU/MM3 (ref 1.8–7.7)
SODIUM BLD-SCNC: 138 MEQ/L (ref 135–145)
TOTAL PROTEIN: 7.8 G/DL (ref 6.1–8)
TROPONIN T: < 0.01 NG/ML
WBC # BLD: 7.1 THOU/MM3 (ref 4.8–10.8)

## 2021-11-03 PROCEDURE — 71045 X-RAY EXAM CHEST 1 VIEW: CPT

## 2021-11-03 PROCEDURE — 6360000004 HC RX CONTRAST MEDICATION: Performed by: PHYSICIAN ASSISTANT

## 2021-11-03 PROCEDURE — 86140 C-REACTIVE PROTEIN: CPT

## 2021-11-03 PROCEDURE — 36415 COLL VENOUS BLD VENIPUNCTURE: CPT

## 2021-11-03 PROCEDURE — 85379 FIBRIN DEGRADATION QUANT: CPT

## 2021-11-03 PROCEDURE — 6370000000 HC RX 637 (ALT 250 FOR IP): Performed by: PHYSICIAN ASSISTANT

## 2021-11-03 PROCEDURE — 71275 CT ANGIOGRAPHY CHEST: CPT

## 2021-11-03 PROCEDURE — 85025 COMPLETE CBC W/AUTO DIFF WBC: CPT

## 2021-11-03 PROCEDURE — 84484 ASSAY OF TROPONIN QUANT: CPT

## 2021-11-03 PROCEDURE — 87635 SARS-COV-2 COVID-19 AMP PRB: CPT

## 2021-11-03 PROCEDURE — 94640 AIRWAY INHALATION TREATMENT: CPT

## 2021-11-03 PROCEDURE — 99283 EMERGENCY DEPT VISIT LOW MDM: CPT

## 2021-11-03 PROCEDURE — 80053 COMPREHEN METABOLIC PANEL: CPT

## 2021-11-03 RX ORDER — IPRATROPIUM BROMIDE AND ALBUTEROL SULFATE 2.5; .5 MG/3ML; MG/3ML
1 SOLUTION RESPIRATORY (INHALATION) ONCE
Status: COMPLETED | OUTPATIENT
Start: 2021-11-03 | End: 2021-11-03

## 2021-11-03 RX ORDER — ONDANSETRON 4 MG/1
4 TABLET, ORALLY DISINTEGRATING ORAL ONCE
Status: COMPLETED | OUTPATIENT
Start: 2021-11-03 | End: 2021-11-03

## 2021-11-03 RX ORDER — 0.9 % SODIUM CHLORIDE 0.9 %
500 INTRAVENOUS SOLUTION INTRAVENOUS ONCE
Status: DISCONTINUED | OUTPATIENT
Start: 2021-11-03 | End: 2021-11-03 | Stop reason: HOSPADM

## 2021-11-03 RX ORDER — MELATONIN
2000 DAILY
Qty: 28 TABLET | Refills: 0 | Status: SHIPPED | OUTPATIENT
Start: 2021-11-03

## 2021-11-03 RX ORDER — DIPHENHYDRAMINE HYDROCHLORIDE 50 MG/ML
50 INJECTION INTRAMUSCULAR; INTRAVENOUS
Status: CANCELLED | OUTPATIENT
Start: 2021-11-03 | End: 2021-11-03

## 2021-11-03 RX ORDER — METHYLPREDNISOLONE SODIUM SUCCINATE 125 MG/2ML
125 INJECTION, POWDER, LYOPHILIZED, FOR SOLUTION INTRAMUSCULAR; INTRAVENOUS
Status: CANCELLED | OUTPATIENT
Start: 2021-11-03 | End: 2021-11-03

## 2021-11-03 RX ORDER — ACETAMINOPHEN 325 MG/1
650 TABLET ORAL ONCE
Status: COMPLETED | OUTPATIENT
Start: 2021-11-03 | End: 2021-11-03

## 2021-11-03 RX ORDER — FLUTICASONE PROPIONATE 100 MCG
BLISTER, WITH INHALATION DEVICE INHALATION
Qty: 60 EACH | Refills: 0 | Status: SHIPPED | OUTPATIENT
Start: 2021-11-03

## 2021-11-03 RX ORDER — ONDANSETRON 4 MG/1
4 TABLET, ORALLY DISINTEGRATING ORAL EVERY 8 HOURS PRN
Qty: 20 TABLET | Refills: 0 | Status: SHIPPED | OUTPATIENT
Start: 2021-11-03

## 2021-11-03 RX ORDER — ALBUTEROL SULFATE 90 UG/1
2 AEROSOL, METERED RESPIRATORY (INHALATION) EVERY 6 HOURS PRN
Qty: 1 EACH | Refills: 0 | Status: SHIPPED | OUTPATIENT
Start: 2021-11-03

## 2021-11-03 RX ORDER — SODIUM CHLORIDE 9 MG/ML
INJECTION, SOLUTION INTRAVENOUS CONTINUOUS PRN
Status: CANCELLED | OUTPATIENT
Start: 2021-11-03 | End: 2021-11-04

## 2021-11-03 RX ORDER — PREDNISONE 20 MG/1
40 TABLET ORAL DAILY
Qty: 10 TABLET | Refills: 0 | Status: SHIPPED | OUTPATIENT
Start: 2021-11-03 | End: 2021-11-08

## 2021-11-03 RX ORDER — GUAIFENESIN 100 MG/5ML
200 SOLUTION ORAL ONCE
Status: COMPLETED | OUTPATIENT
Start: 2021-11-03 | End: 2021-11-03

## 2021-11-03 RX ORDER — SODIUM CHLORIDE 9 MG/ML
100 INJECTION, SOLUTION INTRAVENOUS CONTINUOUS PRN
Status: CANCELLED | OUTPATIENT
Start: 2021-11-03

## 2021-11-03 RX ORDER — MULTIVIT-MIN/IRON/FOLIC ACID/K 18-600-40
500 CAPSULE ORAL 2 TIMES DAILY
Qty: 28 CAPSULE | Refills: 0 | Status: SHIPPED | OUTPATIENT
Start: 2021-11-03

## 2021-11-03 RX ADMIN — GUAIFENESIN 200 MG: 200 SOLUTION ORAL at 13:59

## 2021-11-03 RX ADMIN — ONDANSETRON 4 MG: 4 TABLET, ORALLY DISINTEGRATING ORAL at 13:59

## 2021-11-03 RX ADMIN — IPRATROPIUM BROMIDE AND ALBUTEROL SULFATE 1 AMPULE: .5; 3 SOLUTION RESPIRATORY (INHALATION) at 14:11

## 2021-11-03 RX ADMIN — ACETAMINOPHEN 650 MG: 325 TABLET ORAL at 13:59

## 2021-11-03 RX ADMIN — IOPAMIDOL 80 ML: 755 INJECTION, SOLUTION INTRAVENOUS at 16:23

## 2021-11-03 ASSESSMENT — ENCOUNTER SYMPTOMS
NAUSEA: 1
VOMITING: 0
SORE THROAT: 1
WHEEZING: 1
DIARRHEA: 0
COUGH: 1
SHORTNESS OF BREATH: 1

## 2021-11-03 NOTE — ED PROVIDER NOTES
[facility]  eMERGENCY dEPARTMENT eNCOUnter          CHIEF COMPLAINT       Chief Complaint   Patient presents with    Covid Testing       Nurses Notes reviewed and I agree except as noted inthe HPI. HISTORY OF PRESENT ILLNESS    Cassidy Villar is a 23 y.o. male who presents to the Emergency Department for the evaluation of Covid testing. Patient has not received the Covid vaccine. Reports a history of asthma and is a current smoker. States he is supposed to have a rescue inhaler but does not currently have this. Reports that 5 days ago he developed some sinus symptoms with subsequent development of nausea, fever, chills, generalized body aches, headaches, mild sore throat, cough, wheezing and shortness of breath. He is uncertain whether he has lost taste/smell. Denies any associated chest pain, chest tightness. The HPI was provided by the patient. REVIEW OF SYSTEMS     Review of Systems   Constitutional: Positive for chills and fever. HENT: Positive for congestion and sore throat. Respiratory: Positive for cough, shortness of breath and wheezing. Cardiovascular: Negative for chest pain. Gastrointestinal: Positive for nausea. Negative for diarrhea and vomiting. Neurological: Positive for headaches. All other systems reviewed and are negative. PAST MEDICAL HISTORY    has a past medical history of Anxiety, Asthma, Bipolar 2 disorder ([address] Depression, and Hypertension. SURGICAL HISTORY      has a past surgical history that includes Tympanostomy tube placement.     CURRENT MEDICATIONS       Discharge Medication List as of 11/3/2021  5:03 PM      CONTINUE these medications which have NOT CHANGED    Details   omeprazole (PRILOSEC) 40 MG delayed release capsule Take 1 capsule by mouth every morning (before breakfast), Disp-30 capsule, R-0Print      ibuprofen (ADVIL;MOTRIN) 200 MG tablet Take 400 mg by mouth every 6 hours as needed for PainHistorical Med      Elastic Bandages & Supports (LUMBAR BACK BRACE/SUPPORT PAD) MISC DAILY Starting Wed 10/9/2019, Disp-1 each, R-0, Print             ALLERGIES     is allergic to latex and penicillins. FAMILY HISTORY     He indicated that his mother is alive. He indicated that his father is alive. He indicated that his maternal grandmother is . He indicated that his maternal grandfather is . He indicated that his paternal grandmother is alive. He indicated that the status of his paternal grandfather is unknown.   family history includes Bipolar Disorder in his mother; COPD in his mother and paternal grandmother; Cancer in his maternal grandmother; Diabetes in his maternal grandfather; Heart Disease in his father and paternal grandmother; High Blood Pressure in his father, maternal grandfather, and maternal grandmother; Kidney Disease in his maternal grandmother; Obesity in his maternal grandfather; Other in his maternal grandfather; Stroke in his maternal grandmother. SOCIAL HISTORY      reports that he has been smoking cigarettes. He has been smoking about 1.50 packs per day. He has quit using smokeless tobacco. He reports current alcohol use. He reports current drug use. Frequency: 2.00 times per week. Drugs: Marijuana (Weed) and Cocaine. PHYSICAL EXAM     INITIAL VITALS:  height is 5' 2\" (1.575 m) and weight is 250 lb (113.4 kg). His oral temperature is 100.7 °F (38.2 °C). His blood pressure is 153/83 (abnormal) and his pulse is 110. His respiration is 18 and oxygen saturation is 93%. Physical Exam  Vitals and nursing note reviewed. Constitutional:       Appearance: He is obese. He is ill-appearing. HENT:      Head: Normocephalic and atraumatic. Nose: Nose normal.      Mouth/Throat:      Mouth: Mucous membranes are moist.      Pharynx: No oropharyngeal exudate. Eyes:      Conjunctiva/sclera: Conjunctivae normal.   Cardiovascular:      Rate and Rhythm: Regular rhythm. Tachycardia present.       Heart sounds: Normal heart sounds. No murmur heard. Pulmonary:      Effort: Pulmonary effort is normal. No respiratory distress. Breath sounds: Decreased breath sounds and wheezing present. Abdominal:      Palpations: Abdomen is soft. Tenderness: There is no abdominal tenderness. There is no guarding. Musculoskeletal:         General: Tenderness (generalized muscular tenderness) present. Cervical back: Normal range of motion. Right lower leg: No edema. Left lower leg: No edema. Skin:     General: Skin is warm and dry. Neurological:      General: No focal deficit present. Mental Status: He is alert and oriented to person, place, and time. Psychiatric:         Mood and Affect: Mood normal.         DIFFERENTIAL DIAGNOSIS:   Differential diagnoses are discussed    DIAGNOSTIC RESULTS     EKG: All EKG's are interpreted by the Emergency Department Physician who either signs or Co-signsthis chart in the absence of a cardiologist.          RADIOLOGY: non-plain film images(s) such as CT, Ultrasound and MRI are read by the radiologist.    CTA [address]   Final Result       1. Suboptimal opacification of the pulmonary arteries. No definite evidence of pulmonary embolism. Recommend VQ scan if there is strong clinical concern of pulmonary embolism. 2. Borderline cardiomegaly. 3. Abnormal density in the right lower lobe and to lesser extent right upper lobe presumably representing involvement by Covid 19 infection. .               **This report has been created using voice recognition software. It may contain minor errors which are inherent in voice recognition technology. **      Final report electronically signed by DR Susanne Jara on 11/3/2021 4:35 PM      XR CHEST PORTABLE   Final Result   1. Normal heart size. No effusion. 2. Moderate interstitial pneumonia right infrahilar region. **This report has been created using voice recognition software.   It may contain effusion and with moderate interstitial pneumonia in the right infrahilar region. Follow-up CTA of the chest shows suboptimal opacification of the pulmonary arteries without definite evidence of pulmonary embolism. Borderline cardiomegaly noted with abnormal density in the right lower lobe and to a lesser extent the right upper lobe presumably representing involvement by COVID-19 infection. Results discussed with attending provider who is agreeable with not performing VQ scan at this time. Discuss Regeneron therapy with the patient who was provided with a 5 page informational packet which he read and would like to proceed with treatment. Appointment scheduled for tomorrow with the outpatient infusion center. We will also restart the patient's Flovent and albuterol which he does not currently have, start vitamin C/vitamin D/zinc as well as provide Zofran for symptom control. Pulse oximeter provided to the patient and return precautions were discussed including oxygen saturation less than 90%, inability to tolerate oral intake, dizziness, dehydration, worsening shortness of breath or uncontrolled pain/fevers. Patient verbalized understanding and was agreeable with the above plan, denying further needs upon discharge. He was noted to be ambulatory throughout the department during his ED stay without difficulty. CRITICAL CARE:   None    CONSULTS:  None    PROCEDURES:  None    FINAL IMPRESSION      1. COVID-19    2.  Mild persistent asthma without complication          DISPOSITION/PLAN   Discharge    PATIENT REFERRED TO:  MARY JANE Read - CNP  [address] [facility], [id number] [place] [address] Rd  [facility]  [phone]      As needed    [address]  [phone]    If symptoms worsen      DISCHARGEMEDICATIONS:  Discharge Medication List as of 11/3/2021  5:03 PM      START taking these medications    Details   Ascorbic Acid (VITAMIN C) 500 MG CAPS Take 500 mg by mouth 2 times daily, Disp-28 capsule, R-0Normal      zinc 50 MG CAPS Take 100 mg by mouth nightly, Disp-28 capsule, R-0Normal      vitamin D3 (CHOLECALCIFEROL) 25 MCG (1000 UT) TABS tablet Take 2 tablets by mouth daily, Disp-28 tablet, R-0Normal      predniSONE (DELTASONE) 20 MG tablet Take 2 tablets by mouth daily for 5 days, Disp-10 tablet, R-0Normal      ondansetron (ZOFRAN ODT) 4 MG disintegrating tablet Take 1 tablet by mouth every 8 hours as needed for Nausea or Vomiting, Disp-20 tablet, R-0Normal             (Please note that portions of this note were completedwith a voice recognition program.  Efforts were made to edit the dictations but occasionally words are mis-transcribed.)        Fay Aguirre PA-C  11/03/21 4434

## 2021-11-03 NOTE — Clinical Note
Jordis Goldberg was seen and treated in our emergency department on 11/3/2021. He may return to work on 11/09/2021. If you have any questions or concerns, please don't hesitate to call.       Dayne Tang PA-C

## 2021-11-03 NOTE — ED NOTES
Pt presents to the ed with c/o covid testing. PT states that he has slept for the last 3 days and is just not feeling good. Pt states that his symptoms started on Friday.       Lisbet Weathers [place]  94/[id number]

## 2021-11-04 ENCOUNTER — CARE COORDINATION (OUTPATIENT)
Dept: CARE COORDINATION | Age: 26
End: 2021-11-04

## 2021-11-04 ENCOUNTER — TELEPHONE (OUTPATIENT)
Dept: FAMILY MEDICINE CLINIC | Age: 26
End: 2021-11-04

## 2021-11-04 NOTE — CARE COORDINATION
Attempted to reach [facility] today for ED f/u [place] outreach. No answer.  has not been set up.

## 2021-11-04 NOTE — LETTER
Olvinramonjose Chauhanjennifer  [facility], [address]  Phone: [phone]  Fax: [phone]     November 4, 2021    [address] [place] [address]    Dear Silvina Vallejo,    Thank you for choosing our [facility] on 11/3/21. Your Provider wanted to make sure that you understand your discharge instructions and that you were able to fill any prescriptions that may have been ordered for you. Please contact the office at the above phone number if you were advised to follow up with your Provider, or if you have any further questions or needs. Also did you know -                            [facility] ([facility]) practices can often offer you an appointment on the same day that you call for acute issues. *We have some [address] [facility] offices that offer Walk-in appointments; check our website for availability in your community, [email or web address].      *Evisits are now available for patients through [address] [facility] ([facility]) also offers video visits through [address] If you do not have MyChart and are interested, please contact the office and a staff member may assist you or go to [email or web address].       Sincerely,     MARY JANE Love CNP and your [facility]

## 2021-11-05 ENCOUNTER — CARE COORDINATION (OUTPATIENT)
Dept: CARE COORDINATION | Age: 26
End: 2021-11-05

## 2021-11-05 NOTE — CARE COORDINATION
Attempted to reach [facility] today for ED f/u [facility] outreach. No answer. VM not set up.

## 2022-09-24 ENCOUNTER — HOSPITAL ENCOUNTER (EMERGENCY)
Age: 27
Discharge: HOME | End: 2022-09-24
Attending: EMERGENCY MEDICINE
Payer: COMMERCIAL

## 2022-09-24 VITALS
DIASTOLIC BLOOD PRESSURE: 97 MMHG | BODY MASS INDEX: 47.84 KG/M2 | HEIGHT: 63 IN | OXYGEN SATURATION: 99 % | TEMPERATURE: 98 F | SYSTOLIC BLOOD PRESSURE: 134 MMHG | HEART RATE: 100 BPM | WEIGHT: 270 LBS | RESPIRATION RATE: 16 BRPM

## 2022-09-24 DIAGNOSIS — K02.9 DENTAL CARIES: Primary | ICD-10-CM

## 2022-09-24 PROCEDURE — 6370000000 HC RX 637 (ALT 250 FOR IP): Performed by: EMERGENCY MEDICINE

## 2022-09-24 PROCEDURE — 99283 EMERGENCY DEPT VISIT LOW MDM: CPT

## 2022-09-24 RX ORDER — CLINDAMYCIN HYDROCHLORIDE 300 MG/1
300 CAPSULE ORAL 3 TIMES DAILY
Qty: 21 CAPSULE | Refills: 0 | Status: SHIPPED | OUTPATIENT
Start: 2022-09-24 | End: 2022-10-01

## 2022-09-24 RX ORDER — HYDROCODONE BITARTRATE AND ACETAMINOPHEN 5; 325 MG/1; MG/1
1 TABLET ORAL ONCE
Status: COMPLETED | OUTPATIENT
Start: 2022-09-24 | End: 2022-09-24

## 2022-09-24 RX ORDER — CHLORHEXIDINE GLUCONATE 0.12 MG/ML
15 RINSE ORAL 2 TIMES DAILY
Qty: 300 ML | Refills: 0 | Status: SHIPPED | OUTPATIENT
Start: 2022-09-24 | End: 2022-10-04

## 2022-09-24 RX ORDER — HYDROCODONE BITARTRATE AND ACETAMINOPHEN 5; 325 MG/1; MG/1
1 TABLET ORAL 2 TIMES DAILY PRN
Qty: 10 TABLET | Refills: 0 | Status: SHIPPED | OUTPATIENT
Start: 2022-09-24 | End: 2022-09-29

## 2022-09-24 RX ADMIN — HYDROCODONE BITARTRATE AND ACETAMINOPHEN 1 TABLET: 5; 325 TABLET ORAL at 11:27

## 2022-09-24 ASSESSMENT — PAIN DESCRIPTION - FREQUENCY: FREQUENCY: CONTINUOUS

## 2022-09-24 ASSESSMENT — PAIN - FUNCTIONAL ASSESSMENT: PAIN_FUNCTIONAL_ASSESSMENT: 0-10

## 2022-09-24 ASSESSMENT — PAIN DESCRIPTION - PAIN TYPE: TYPE: ACUTE PAIN

## 2022-09-24 ASSESSMENT — PAIN SCALES - GENERAL: PAINLEVEL_OUTOF10: 10

## 2022-09-24 ASSESSMENT — PAIN DESCRIPTION - ORIENTATION: ORIENTATION: RIGHT;UPPER

## 2022-09-24 ASSESSMENT — PAIN DESCRIPTION - LOCATION: LOCATION: MOUTH

## 2022-09-24 NOTE — ED PROVIDER NOTES
[facility]\"" [address] [address] EMERGENCY DEPT      CHIEF COMPLAINT       Chief Complaint   Patient presents with    Dental Pain     Right upper        Nurses Notes reviewed and I agree except as noted in the HPI. HISTORY OF PRESENT ILLNESS    Ministerio Almazan is a 33 y.o. male who presents with complaint of dental pain, patient has history of dental caries, states that he was supposed to have seen a dentist 2 months ago but could not make the appointment. States that he has had pain to his tooth #1 and 5 on and off for the past few months. He has no trouble swallowing, however, he has pain with chewing. No fevers or chills. Onset: Acute on chronic  Duration: Pain worsened yesterday  Timing: Persistent  Location of Pain: Tooth #1 and 5  Intesity/severity: Moderate  Modifying Factors: Eating/chewing  Relieved by;  Previous Episodes; Tx Before arrival: None  REVIEW OF SYSTEMS      Review of Systems   Constitutional: Negative for fever, chills, diaphoresis and fatigue. HENT: Negative for congestion, drooling, facial swelling and sore throat. Positive for dental caries and pain. Eyes: Negative for photophobia, pain and discharge. Respiratory: Negative for cough, shortness of breath, wheezing and stridor. Cardiovascular: Negative for chest pain, palpitations and leg swelling. Gastrointestinal: Negative for abdominal pain, blood in stool and abdominal distention. Endocrine: Negative for cold intolerance, heat intolerance, polydipsia and polyuria. Genitourinary: Negative for dysuria, urgency, hematuria and difficulty urinating. Musculoskeletal: Negative for gait problem, neck pain and neck stiffness. Skin; No rash, No itching  Neurological: Negative for seizures, weakness and numbness. Hematological: Negative for adenopathy. Does not bruise/bleed easily. PAST MEDICAL HISTORY    has a past medical history of Anxiety, Asthma, Bipolar 2 disorder ([facility] [address] Depression, and Hypertension.     SURGICAL HISTORY      has a past surgical history that includes Tympanostomy tube placement. CURRENT MEDICATIONS       Previous Medications    ALBUTEROL SULFATE HFA (PROVENTIL HFA) 108 (90 BASE) MCG/ACT INHALER    Inhale 2 puffs into the lungs every 6 hours as needed for Wheezing    ASCORBIC ACID (VITAMIN C) 500 MG CAPS    Take 500 mg by mouth 2 times daily    ELASTIC BANDAGES & SUPPORTS (LUMBAR BACK BRACE/SUPPORT PAD) MISC    1 each by Does not apply route daily    FLUTICASONE PROPIONATE (FLOVENT DISKUS) 100 MCG/BLIST AEPB INHALER    Take 1 puff 2 times daily. Rinse mouth after using. IBUPROFEN (ADVIL;MOTRIN) 200 MG TABLET    Take 400 mg by mouth every 6 hours as needed for Pain    OMEPRAZOLE (PRILOSEC) 40 MG DELAYED RELEASE CAPSULE    Take 1 capsule by mouth every morning (before breakfast)    ONDANSETRON (ZOFRAN ODT) 4 MG DISINTEGRATING TABLET    Take 1 tablet by mouth every 8 hours as needed for Nausea or Vomiting    VITAMIN D3 (CHOLECALCIFEROL) 25 MCG (1000 UT) TABS TABLET    Take 2 tablets by mouth daily    ZINC 50 MG CAPS    Take 100 mg by mouth nightly       ALLERGIES     is allergic to latex and penicillins. FAMILY HISTORY     He indicated that his mother is alive. He indicated that his father is alive. He indicated that his maternal grandmother is . He indicated that his maternal grandfather is . He indicated that his paternal grandmother is alive. He indicated that the status of his paternal grandfather is unknown.   family history includes Bipolar Disorder in his mother; COPD in his mother and paternal grandmother; Cancer in his maternal grandmother; Diabetes in his maternal grandfather; Heart Disease in his father and paternal grandmother; High Blood Pressure in his father, maternal grandfather, and maternal grandmother; Kidney Disease in his maternal grandmother; Obesity in his maternal grandfather; Other in his maternal grandfather; Stroke in his maternal grandmother.     SOCIAL HISTORY      reports that he has been smoking cigarettes. He has been smoking an average of 1.5 packs per day. He has quit using smokeless tobacco. He reports current alcohol use. He reports current drug use. Frequency: 2.00 times per week. Drugs: Marijuana (Weed) and Cocaine. PHYSICAL EXAM     INITIAL VITALS:  height is 5' 3\" (1.6 m) and weight is 270 lb (122.5 kg). His oral temperature is 98 °F (36.7 °C). His blood pressure is 134/97 (abnormal) and his pulse is 100. His respiration is 16 and oxygen saturation is 99%. Physical Exam   Constitutional:  well-developed and well-nourished. HENT: Head: Normocephalic, atraumatic, Bilateral external ears normal, Oropharynx mosit, No oral exudates, Nose normal.   Extensive dental caries involving tooth #1 and 5. Eyes: PERRL, EOMI, Conjunctiva normal, No discharge. No scleral icterus  Neck: Normal range of motion, No tenderness, Supple  Lympatics: No lymphadenopathy. Cardiovascular: Normal rate, regular rhythm, S1 normal and S2 normal.  Exam reveals no gallop. Pulmonary/Chest: Effort normal and breath sounds normal. No accessory muscle usage or stridor. No respiratory distress. no wheezes. has no rales. exhibits no tenderness. Abdominal: Soft. Bowel sounds are normal.  exhibits no distension. There is no tenderness. There is no rebound and no guarding. Extremities: No edema, no tenderness, no cyanosis, no clubbing. Musculoskeletal: Good range of motion in major joints is observed. No major deformities noted. Neurological: Alert and oriented ×3, normal motor function, normal sensory function, no focal deficits. GCS 15  Skin: Skin is warm, dry and intact. No rash noted. No erythema.    Psychiatric: Affect normal, judgment normal, mood normal.  DIFFERENTIAL DIAGNOSIS:       DIAGNOSTIC RESULTS     EKG: All EKG's are interpreted by the Emergency Department Physician who either signs or Co-signs this chart in the absence of a cardiologist.      RADIOLOGY: non-plain film images(s) such as CT, Ultrasound and MRI are read by the radiologist.  Plain radiographic images are visualized and preliminarily interpreted by the emergency physician unless otherwise stated below. LABS:   Labs Reviewed - No data to display    EMERGENCY DEPARTMENT COURSE:   Vitals:    Vitals:    09/24/22 1054   BP: (!) 134/97   Pulse: 100   Resp: 16   Temp: 98 °F (36.7 °C)   TempSrc: Oral   SpO2: 99%   Weight: 270 lb (122.5 kg)   Height: 5' 3\" (1.6 m)     Patient presenting with dental caries, dental pain involving 2 teeth. Patient has no trouble breathing or swallowing. No pain in the neck/face. No facial cellulitis. CRITICAL CARE:       CONSULTS:  None    PROCEDURES:  None    FINAL IMPRESSION      1. Dental caries          DISPOSITION/PLAN   Decision To Discharge    PATIENT REFERRED TO:  No follow-up provider specified. DISCHARGE MEDICATIONS:  New Prescriptions    CHLORHEXIDINE (PERIDEX) 0.12 % SOLUTION    Take 15 mLs by mouth 2 times daily for 10 days Use as mouthwash twice daily for 5 days. Swish and Spit. DO NOT swallow    CLINDAMYCIN (CLEOCIN) 300 MG CAPSULE    Take 1 capsule by mouth 3 times daily for 7 days    HYDROCODONE-ACETAMINOPHEN (NORCO) 5-325 MG PER TABLET    Take 1 tablet by mouth 2 times daily as needed for Pain for up to 5 days.        (Please note that portions of this note were completed with a voice recognition program.  Efforts were made to edit the dictations but occasionally words are mis-transcribed.)    Elder Pencil, DO             Elder Pencil, DO  09/24/22 5573

## 2022-09-26 ENCOUNTER — TELEPHONE (OUTPATIENT)
Dept: FAMILY MEDICINE CLINIC | Age: 27
End: 2022-09-26

## 2022-09-26 NOTE — LETTER
Willie Mason  [address] [facility], [address]  Phone: [phone]  Fax: [phone]     September 26, 2022    [address]  [address] [address]    Dear Janee Pierre,    Thank you for choosing our [facility] on 9/24/22. Your Provider wanted to make sure that you understand your discharge instructions and that you were able to fill any prescriptions that may have been ordered for you. Please contact the office at the above phone number if you were advised to follow up with your Provider, or if you have any further questions or needs. Also did you know -                            [facility] ([facility]) practices can often offer you an appointment on the same day that you call for acute issues. *We have some [facility] offices that offer Walk-in appointments; check our website for availability in your community, [email or web address].      *Evisits are now available for patients through [address] [facility] ([facility]) also offers video visits through [address] If you do not have MyChart and are interested, please contact the office and a staff member may assist you or go to [email or web address].       Sincerely,     MARY JANE Nolasco CNP and your [facility]

## 2023-04-03 ENCOUNTER — HOSPITAL ENCOUNTER (EMERGENCY)
Age: 28
Discharge: HOME | End: 2023-04-04
Attending: EMERGENCY MEDICINE
Payer: COMMERCIAL

## 2023-04-03 DIAGNOSIS — J45.901 EXACERBATION OF ASTHMA, UNSPECIFIED ASTHMA SEVERITY, UNSPECIFIED WHETHER PERSISTENT: Primary | ICD-10-CM
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
## 2023-04-03 PROCEDURE — 93005 ELECTROCARDIOGRAM TRACING: CPT | Performed by: NURSE PRACTITIONER

## 2023-04-03 PROCEDURE — 99285 EMERGENCY DEPT VISIT HI MDM: CPT

## 2023-04-03 PROCEDURE — 82375 ASSAY CARBOXYHB QUANT: CPT

## 2023-04-03 RX ORDER — ALBUTEROL SULFATE 2.5 MG/3ML
5 SOLUTION RESPIRATORY (INHALATION) ONCE
Status: COMPLETED | OUTPATIENT
Start: 2023-04-04 | End: 2023-04-04

## 2023-04-03 ASSESSMENT — PAIN - FUNCTIONAL ASSESSMENT: PAIN_FUNCTIONAL_ASSESSMENT: 0-10

## 2023-04-03 ASSESSMENT — PAIN SCALES - GENERAL: PAINLEVEL_OUTOF10: 4

## 2023-04-03 ASSESSMENT — PAIN DESCRIPTION - LOCATION: LOCATION: CHEST

## 2023-04-03 NOTE — Clinical Note
Crystal Freeman was seen and treated in our emergency department on 4/3/2023. He may return to work on 04/05/2023. If you have any questions or concerns, please don't hesitate to call.       Esther Randle MD

## 2023-04-03 NOTE — LETTER
[facility] [address] [address] EMERGENCY DEPT  [address] [place] [address]  Phone: [phone]               April 4, 2023    Patient: Romana Miller   YOB: 1995   Date of Visit: 4/3/2023       To Whom It May Concern:    Marisa Robert was seen and treated in our emergency department on 4/3/2023. He may return to work on 4/5/2023.       Sincerely,       Graciela Wyatt RN         Signature:__________________________________

## 2023-04-04 ENCOUNTER — TELEPHONE (OUTPATIENT)
Dept: FAMILY MEDICINE CLINIC | Age: 28
End: 2023-04-04

## 2023-04-04 VITALS
TEMPERATURE: 98.4 F | HEIGHT: 63 IN | WEIGHT: 300 LBS | RESPIRATION RATE: 22 BRPM | OXYGEN SATURATION: 99 % | BODY MASS INDEX: 53.16 KG/M2 | HEART RATE: 102 BPM | SYSTOLIC BLOOD PRESSURE: 117 MMHG | DIASTOLIC BLOOD PRESSURE: 83 MMHG

## 2023-04-04 LAB
ARTERIAL PATENCY WRIST A: POSITIVE
BASE EXCESS BLDA CALC-SCNC: 3.4 MMOL/L (ref -2.5–2.5)
BDY SITE: ABNORMAL
COHGB MFR BLDV: 9 % CO SAT
COLLECTED BY:: ABNORMAL
DEVICE: ABNORMAL
FIO2 ON VENT O2 ANALYZER: 100 %
HCO3 BLDA-SCNC: 29 MMOL/L (ref 23–28)
PCO2 BLDA: 49 MMHG (ref 35–45)
PH BLDA: 7.39 [PH] (ref 7.35–7.45)
PO2 BLDA: 112 MMHG (ref 71–104)
SAO2 % BLDA: 98 %

## 2023-04-04 PROCEDURE — 82803 BLOOD GASES ANY COMBINATION: CPT

## 2023-04-04 PROCEDURE — 6360000002 HC RX W HCPCS: Performed by: NURSE PRACTITIONER

## 2023-04-04 PROCEDURE — 94761 N-INVAS EAR/PLS OXIMETRY MLT: CPT

## 2023-04-04 PROCEDURE — 2700000000 HC OXYGEN THERAPY PER DAY

## 2023-04-04 PROCEDURE — 36600 WITHDRAWAL OF ARTERIAL BLOOD: CPT

## 2023-04-04 PROCEDURE — 93010 ELECTROCARDIOGRAM REPORT: CPT | Performed by: NUCLEAR MEDICINE

## 2023-04-04 PROCEDURE — 6370000000 HC RX 637 (ALT 250 FOR IP): Performed by: NURSE PRACTITIONER

## 2023-04-04 PROCEDURE — 94644 CONT INHLJ TX 1ST HOUR: CPT

## 2023-04-04 RX ORDER — ALBUTEROL SULFATE 90 UG/1
2 AEROSOL, METERED RESPIRATORY (INHALATION) 4 TIMES DAILY PRN
Qty: 18 G | Refills: 0 | Status: SHIPPED | OUTPATIENT
Start: 2023-04-04

## 2023-04-04 RX ORDER — PREDNISONE 20 MG/1
60 TABLET ORAL ONCE
Status: COMPLETED | OUTPATIENT
Start: 2023-04-04 | End: 2023-04-04

## 2023-04-04 RX ADMIN — ALBUTEROL SULFATE 5 MG/HR: 2.5 SOLUTION RESPIRATORY (INHALATION) at 00:25

## 2023-04-04 RX ADMIN — PREDNISONE 60 MG: 20 TABLET ORAL at 00:26

## 2023-04-04 ASSESSMENT — PAIN - FUNCTIONAL ASSESSMENT: PAIN_FUNCTIONAL_ASSESSMENT: NONE - DENIES PAIN

## 2023-04-04 NOTE — ED NOTES
Pt placed on NRB at this time per Dr. Angel Lunsford per carboxyhemoglobin result.      David Alexander RN  04/04/23 7829
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
## 2023-04-04 NOTE — ED NOTES
Pt given warm blanket per request. Pt in bed watching tv. Lights dimmed for comfort. Updated on plan of care. Call light in reach.      Nataly Bonds RN  04/04/23 5670

## 2023-04-04 NOTE — LETTER
[facility], [address]  Phone: [phone]  Fax: [phone]     April 4, 2023    [address] [facility]  [address] [address]    Dear Laura Rhodes,    Thank you for choosing our [facility] on 4/4/23. Your Provider wanted to make sure that you understand your discharge instructions and that you were able to fill any prescriptions that may have been ordered for you. Please contact the office at the above phone number if you were advised to follow up with your Provider, or if you have any further questions or needs. Also did you know -                            [facility] ([facility]) practices can often offer you an appointment on the same day that you call for acute issues. *We have some [facility] offices that offer Walk-in appointments; check our website for availability in your community, www. Click [email or web address] Grow.      *Evisits are now available for patients through [address] [facility] ([facility]) also offers video visits through [address] If you do not have MyChart and are interested, please contact the office and a staff member may assist you or go to [email or web address]       Sincerely,     MARY JANE Mendoza CNP and your [facility]

## 2023-04-04 NOTE — ED NOTES
Pt O2 sats @ 90% on RA on arrival. Pt has auditory wheezing with inspiration and expiration. Pt placed on O2 @ 2L via NC at this time.      Ab Cantor RN  04/03/23 0202

## 2023-04-06 LAB
EKG ATRIAL RATE: 113 BPM
EKG P AXIS: 54 DEGREES
EKG P-R INTERVAL: 160 MS
EKG Q-T INTERVAL: 312 MS
EKG QRS DURATION: 82 MS
EKG QTC CALCULATION (BAZETT): 427 MS
EKG R AXIS: 51 DEGREES
EKG T AXIS: 49 DEGREES
EKG VENTRICULAR RATE: 113 BPM

## 2023-11-05 ENCOUNTER — APPOINTMENT (OUTPATIENT)
Dept: GENERAL RADIOLOGY | Age: 28
End: 2023-11-05
Payer: COMMERCIAL

## 2023-11-05 ENCOUNTER — HOSPITAL ENCOUNTER (EMERGENCY)
Age: 28
Discharge: HOME | End: 2023-11-05
Payer: COMMERCIAL

## 2023-11-05 VITALS
DIASTOLIC BLOOD PRESSURE: 94 MMHG | HEART RATE: 107 BPM | TEMPERATURE: 98.4 F | RESPIRATION RATE: 24 BRPM | WEIGHT: 315 LBS | BODY MASS INDEX: 56.69 KG/M2 | SYSTOLIC BLOOD PRESSURE: 168 MMHG | OXYGEN SATURATION: 93 %

## 2023-11-05 DIAGNOSIS — J40 BRONCHITIS: Primary | ICD-10-CM

## 2023-11-05 LAB
FLUAV RNA RESP QL NAA+PROBE: NOT DETECTED
FLUBV RNA RESP QL NAA+PROBE: NOT DETECTED
SARS-COV-2 RNA RESP QL NAA+PROBE: NOT DETECTED

## 2023-11-05 PROCEDURE — 87636 SARSCOV2 & INF A&B AMP PRB: CPT

## 2023-11-05 PROCEDURE — 6370000000 HC RX 637 (ALT 250 FOR IP): Performed by: PHYSICIAN ASSISTANT

## 2023-11-05 PROCEDURE — 99284 EMERGENCY DEPT VISIT MOD MDM: CPT

## 2023-11-05 PROCEDURE — 71046 X-RAY EXAM CHEST 2 VIEWS: CPT

## 2023-11-05 RX ORDER — PREDNISONE 50 MG/1
50 TABLET ORAL DAILY
Qty: 4 TABLET | Refills: 0 | Status: SHIPPED | OUTPATIENT
Start: 2023-11-05 | End: 2023-11-09

## 2023-11-05 RX ORDER — ALBUTEROL SULFATE 90 UG/1
2 AEROSOL, METERED RESPIRATORY (INHALATION)
Status: COMPLETED | OUTPATIENT
Start: 2023-11-05 | End: 2023-11-05

## 2023-11-05 RX ORDER — PREDNISONE 20 MG/1
60 TABLET ORAL ONCE
Status: COMPLETED | OUTPATIENT
Start: 2023-11-05 | End: 2023-11-05

## 2023-11-05 RX ORDER — AZITHROMYCIN 250 MG/1
250 TABLET, FILM COATED ORAL SEE ADMIN INSTRUCTIONS
Qty: 6 TABLET | Refills: 0 | Status: SHIPPED | OUTPATIENT
Start: 2023-11-05 | End: 2023-11-10

## 2023-11-05 RX ADMIN — PREDNISONE 60 MG: 20 TABLET ORAL at 21:11

## 2023-11-05 RX ADMIN — ALBUTEROL SULFATE 2 PUFF: 90 AEROSOL, METERED RESPIRATORY (INHALATION) at 21:12

## 2023-11-06 NOTE — ED PROVIDER NOTES
[address] [facility] EMERGENCY DEPT      Pt Name: Niall Thomas  MRN: [id number]  [address] [facility] [place] 1995  Date of evaluation: 11/5/2023  Provider: Steve Cadet PA-C    CHIEF COMPLAINT       Chief Complaint   Patient presents with    Cough       Nurses Notes reviewed and I agree except as noted in the HPI. HISTORY OF PRESENT ILLNESS    Niall Thomas is a 33 y.o. male who presents through the lobby for cough. Patient reports that he got sick 3 weeks ago however took over-the-counter medicines and got better within a week. However since then a cough has remained, nasal congestion, and mild sore throat from the coughing. He has coughing \"fits\" that cause shortness of breath, voice change, and chest soreness. The cough can be productive at times but there is no hemoptysis. Patient reports an occasional episode of posttussive emesis. Patient reports he otherwise feels fine. He is able to work and do his normal ADLs. He is just tired of this coughing. Patient explains he is a  and is difficult to drive with these coughing fits. Patient quit smoking due to his symptoms. Patient denies dyspnea on exertion, weakness, dizziness, presyncopal feeling, fever, chills, nausea, or other complaints. PAST MEDICAL HISTORY    has a past medical history of Anxiety, Asthma, Bipolar 2 disorder ([address]), Depression, and Hypertension. SURGICAL HISTORY      has a past surgical history that includes Tympanostomy tube placement. CURRENT MEDICATIONS       Discharge Medication List as of 11/5/2023  9:07 PM        CONTINUE these medications which have NOT CHANGED    Details   albuterol sulfate HFA (VENTOLIN HFA) 108 (90 Base) MCG/ACT inhaler Inhale 2 puffs into the lungs 4 times daily as needed for Wheezing, Disp-18 g, R-0Normal      fluticasone propionate (FLOVENT DISKUS) 100 MCG/BLIST AEPB inhaler Take 1 puff 2 times daily.   Rinse mouth after using., Disp-60 each, R-0Normal      Ascorbic Acid (VITAMIN C) 500 MG CAPS

## 2024-11-21 ENCOUNTER — HOSPITAL ENCOUNTER (EMERGENCY)
Age: 29
Discharge: HOME | End: 2024-11-21
Payer: COMMERCIAL

## 2024-11-21 VITALS
OXYGEN SATURATION: 99 % | HEART RATE: 84 BPM | WEIGHT: 300 LBS | RESPIRATION RATE: 18 BRPM | DIASTOLIC BLOOD PRESSURE: 83 MMHG | BODY MASS INDEX: 53.14 KG/M2 | SYSTOLIC BLOOD PRESSURE: 161 MMHG | TEMPERATURE: 98.7 F

## 2024-11-21 DIAGNOSIS — K04.7 DENTAL INFECTION: ICD-10-CM

## 2024-11-21 DIAGNOSIS — K02.9 DENTAL CARIES: Primary | ICD-10-CM

## 2024-11-21 PROCEDURE — 99284 EMERGENCY DEPT VISIT MOD MDM: CPT

## 2024-11-21 PROCEDURE — 6360000002 HC RX W HCPCS: Performed by: NURSE PRACTITIONER

## 2024-11-21 PROCEDURE — 96372 THER/PROPH/DIAG INJ SC/IM: CPT

## 2024-11-21 PROCEDURE — 6370000000 HC RX 637 (ALT 250 FOR IP): Performed by: NURSE PRACTITIONER

## 2024-11-21 RX ORDER — ETODOLAC 300 MG/1
300 CAPSULE ORAL EVERY 8 HOURS
Qty: 30 CAPSULE | Refills: 0 | Status: SHIPPED | OUTPATIENT
Start: 2024-11-21

## 2024-11-21 RX ORDER — KETOROLAC TROMETHAMINE 30 MG/ML
30 INJECTION, SOLUTION INTRAMUSCULAR; INTRAVENOUS ONCE
Status: COMPLETED | OUTPATIENT
Start: 2024-11-21 | End: 2024-11-21

## 2024-11-21 RX ORDER — CLINDAMYCIN HYDROCHLORIDE 150 MG/1
450 CAPSULE ORAL ONCE
Status: COMPLETED | OUTPATIENT
Start: 2024-11-21 | End: 2024-11-21

## 2024-11-21 RX ORDER — CLINDAMYCIN HYDROCHLORIDE 150 MG/1
450 CAPSULE ORAL 4 TIMES DAILY
Qty: 84 CAPSULE | Refills: 0 | Status: SHIPPED | OUTPATIENT
Start: 2024-11-21 | End: 2024-11-28

## 2024-11-21 RX ADMIN — KETOROLAC TROMETHAMINE 30 MG: 30 INJECTION, SOLUTION INTRAMUSCULAR at 21:50

## 2024-11-21 RX ADMIN — CLINDAMYCIN HYDROCHLORIDE 450 MG: 150 CAPSULE ORAL at 21:50

## 2024-11-21 RX ADMIN — Medication 5 ML: at 21:50

## 2024-11-21 ASSESSMENT — PAIN DESCRIPTION - LOCATION: LOCATION: MOUTH

## 2024-11-21 ASSESSMENT — PAIN DESCRIPTION - DESCRIPTORS: DESCRIPTORS: ACHING

## 2024-11-21 ASSESSMENT — PAIN - FUNCTIONAL ASSESSMENT: PAIN_FUNCTIONAL_ASSESSMENT: 0-10

## 2024-11-21 ASSESSMENT — PAIN DESCRIPTION - ORIENTATION: ORIENTATION: LEFT;RIGHT

## 2024-11-21 ASSESSMENT — PAIN SCALES - GENERAL: PAINLEVEL_OUTOF10: 10

## 2024-11-22 NOTE — DISCHARGE INSTRUCTIONS
Salt water gargles and good oral hygiene.  It is imperative that you see a dentist as soon as possible.  Make sure you finish all of your antibiotics.  Return for fever, inability to open your mouth, unable to swallow your secretions or any other concerns.

## 2024-11-22 NOTE — ED PROVIDER NOTES
[facility] EMERGENCY DEPT      EMERGENCY MEDICINE     Pt Name: Lowell Higuera  MRN: [id number]  Birthdate 1995  Date of evaluation: 11/21/2024  Provider: MARY JANE Erickson CNP    CHIEF COMPLAINT       Chief Complaint   Patient presents with    Dental Pain     HISTORY OF PRESENT ILLNESS   Lowell Higuera is a pleasant 29 y.o. male who presents to the emergency department from home with c/o dental pain, swelling and nausea.  Patient states pain is so bad he is having a hard time sleeping.  No trismus.  Minimal facial swelling.  No drooling      History is obtained from:  patient  PASTMEDICAL HISTORY     Past Medical History:   Diagnosis Date    Anxiety     Asthma     Bipolar 2 disorder (HCC)     Depression     Hypertension        Patient Active Problem List   Diagnosis Code    Shortness of breath R06.02     SURGICAL HISTORY       Past Surgical History:   Procedure Laterality Date    TYMPANOSTOMY TUBE PLACEMENT         CURRENT MEDICATIONS       Previous Medications    ALBUTEROL SULFATE HFA (VENTOLIN HFA) 108 (90 BASE) MCG/ACT INHALER    Inhale 2 puffs into the lungs 4 times daily as needed for Wheezing    ASCORBIC ACID (VITAMIN C) 500 MG CAPS    Take 500 mg by mouth 2 times daily    ELASTIC BANDAGES & SUPPORTS (LUMBAR BACK BRACE/SUPPORT PAD) MISC    1 each by Does not apply route daily    FLUTICASONE PROPIONATE (FLOVENT DISKUS) 100 MCG/BLIST AEPB INHALER    Take 1 puff 2 times daily.  Rinse mouth after using.    IBUPROFEN (ADVIL;MOTRIN) 200 MG TABLET    Take 400 mg by mouth every 6 hours as needed for Pain    OMEPRAZOLE (PRILOSEC) 40 MG DELAYED RELEASE CAPSULE    Take 1 capsule by mouth every morning (before breakfast)    ONDANSETRON (ZOFRAN ODT) 4 MG DISINTEGRATING TABLET    Take 1 tablet by mouth every 8 hours as needed for Nausea or Vomiting    VITAMIN D3 (CHOLECALCIFEROL) 25 MCG (1000 UT) TABS TABLET    Take 2 tablets by mouth daily    ZINC 50 MG CAPS    Take 100 mg by mouth nightly       ALLERGIES

## 2024-11-22 NOTE — ED TRIAGE NOTES
Patient presents to ED with chief complaint of dental pain for 2 days. Patient states the pain is upper left and right of mouth. Patient resting in bed. Respirations easy and unlabored. No distress noted. Call light within reach.

## 2025-01-01 ENCOUNTER — HOSPITAL ENCOUNTER (EMERGENCY)
Age: 30
Discharge: HOME | End: 2025-01-01
Attending: EMERGENCY MEDICINE
Payer: COMMERCIAL

## 2025-01-01 ENCOUNTER — APPOINTMENT (OUTPATIENT)
Dept: GENERAL RADIOLOGY | Age: 30
End: 2025-01-01
Payer: COMMERCIAL

## 2025-01-01 VITALS
OXYGEN SATURATION: 94 % | TEMPERATURE: 97.9 F | HEART RATE: 99 BPM | SYSTOLIC BLOOD PRESSURE: 133 MMHG | DIASTOLIC BLOOD PRESSURE: 96 MMHG | RESPIRATION RATE: 18 BRPM

## 2025-01-01 DIAGNOSIS — J02.9 VIRAL PHARYNGITIS: ICD-10-CM

## 2025-01-01 DIAGNOSIS — J06.9 UPPER RESPIRATORY TRACT INFECTION, UNSPECIFIED TYPE: Primary | ICD-10-CM

## 2025-01-01 LAB
ALBUMIN SERPL BCG-MCNC: 4 G/DL (ref 3.5–5.1)
ALP SERPL-CCNC: 120 U/L (ref 38–126)
ALT SERPL W/O P-5'-P-CCNC: 9 U/L (ref 11–66)
ANION GAP SERPL CALC-SCNC: 9 MEQ/L (ref 8–16)
AST SERPL-CCNC: 14 U/L (ref 5–40)
BILIRUB SERPL-MCNC: < 0.2 MG/DL (ref 0.3–1.2)
BUN SERPL-MCNC: 12 MG/DL (ref 7–22)
CALCIUM SERPL-MCNC: 8.8 MG/DL (ref 8.5–10.5)
CHLORIDE SERPL-SCNC: 101 MEQ/L (ref 98–111)
CO2 SERPL-SCNC: 27 MEQ/L (ref 23–33)
CREAT SERPL-MCNC: 0.7 MG/DL (ref 0.4–1.2)
DEPRECATED RDW RBC AUTO: 44.4 FL (ref 35–45)
ERYTHROCYTE [DISTWIDTH] IN BLOOD BY AUTOMATED COUNT: 13.8 % (ref 11.5–14.5)
FLUAV RNA RESP QL NAA+PROBE: NOT DETECTED
FLUBV RNA RESP QL NAA+PROBE: NOT DETECTED
GFR SERPL CREATININE-BSD FRML MDRD: > 90 ML/MIN/1.73M2
GLUCOSE SERPL-MCNC: 106 MG/DL (ref 70–108)
HCT VFR BLD AUTO: 43.2 % (ref 42–52)
HGB BLD-MCNC: 14.5 GM/DL (ref 14–18)
MCH RBC QN AUTO: 29.8 PG (ref 26–33)
MCHC RBC AUTO-ENTMCNC: 33.6 GM/DL (ref 32.2–35.5)
MCV RBC AUTO: 88.7 FL (ref 80–94)
OSMOLALITY SERPL CALC.SUM OF ELEC: 274 MOSMOL/KG (ref 275–300)
PLATELET # BLD AUTO: 287 THOU/MM3 (ref 130–400)
PMV BLD AUTO: 9.7 FL (ref 9.4–12.4)
POTASSIUM SERPL-SCNC: 4 MEQ/L (ref 3.5–5.2)
PROT SERPL-MCNC: 7.3 G/DL (ref 6.1–8)
RBC # BLD AUTO: 4.87 MILL/MM3 (ref 4.7–6.1)
S PYO AG THROAT QL: NEGATIVE
S PYO THROAT QL CULT: NORMAL
SARS-COV-2 RNA RESP QL NAA+PROBE: NOT DETECTED
SODIUM SERPL-SCNC: 137 MEQ/L (ref 135–145)
WBC # BLD AUTO: 10.8 THOU/MM3 (ref 4.8–10.8)

## 2025-01-01 PROCEDURE — 99284 EMERGENCY DEPT VISIT MOD MDM: CPT

## 2025-01-01 PROCEDURE — 94761 N-INVAS EAR/PLS OXIMETRY MLT: CPT

## 2025-01-01 PROCEDURE — 85027 COMPLETE CBC AUTOMATED: CPT

## 2025-01-01 PROCEDURE — 6370000000 HC RX 637 (ALT 250 FOR IP)

## 2025-01-01 PROCEDURE — 71046 X-RAY EXAM CHEST 2 VIEWS: CPT

## 2025-01-01 PROCEDURE — 87880 STREP A ASSAY W/OPTIC: CPT

## 2025-01-01 PROCEDURE — 94640 AIRWAY INHALATION TREATMENT: CPT

## 2025-01-01 PROCEDURE — 87070 CULTURE OTHR SPECIMN AEROBIC: CPT

## 2025-01-01 PROCEDURE — 80053 COMPREHEN METABOLIC PANEL: CPT

## 2025-01-01 PROCEDURE — 87636 SARSCOV2 & INF A&B AMP PRB: CPT

## 2025-01-01 PROCEDURE — 36415 COLL VENOUS BLD VENIPUNCTURE: CPT

## 2025-01-01 RX ORDER — CETIRIZINE HYDROCHLORIDE 10 MG/1
10 TABLET ORAL DAILY
Qty: 30 TABLET | Refills: 0 | Status: SHIPPED | OUTPATIENT
Start: 2025-01-01

## 2025-01-01 RX ORDER — FLUTICASONE PROPIONATE 50 MCG
1 SPRAY, SUSPENSION (ML) NASAL DAILY
Qty: 32 G | Refills: 1 | Status: SHIPPED | OUTPATIENT
Start: 2025-01-01

## 2025-01-01 RX ORDER — IPRATROPIUM BROMIDE AND ALBUTEROL SULFATE 2.5; .5 MG/3ML; MG/3ML
1 SOLUTION RESPIRATORY (INHALATION)
Status: DISCONTINUED | OUTPATIENT
Start: 2025-01-01 | End: 2025-01-01 | Stop reason: HOSPADM

## 2025-01-01 RX ADMIN — IPRATROPIUM BROMIDE AND ALBUTEROL SULFATE 1 DOSE: .5; 3 SOLUTION RESPIRATORY (INHALATION) at 12:55

## 2025-01-01 RX ADMIN — Medication 5 ML: at 12:39

## 2025-01-01 ASSESSMENT — PAIN DESCRIPTION - LOCATION: LOCATION: RIB CAGE

## 2025-01-01 ASSESSMENT — PAIN SCALES - GENERAL: PAINLEVEL_OUTOF10: 6

## 2025-01-01 ASSESSMENT — PAIN - FUNCTIONAL ASSESSMENT: PAIN_FUNCTIONAL_ASSESSMENT: 0-10

## 2025-01-01 NOTE — ED NOTES
seconds.      Coloration: Skin is not jaundiced or pale.      Findings: No erythema.   Neurological:      General: No focal deficit present.      Mental Status: He is alert and oriented to person, place, and time.   Psychiatric:         Mood and Affect: Mood normal.         ED RESULTS   Laboratory results (none if blank):  Labs Reviewed   COVID-19 & INFLUENZA COMBO   CULTURE, THROAT    Narrative:     Source: Specimen not received       Site:           Current Antibiotics:   GROUP A STREP, REFLEX   COMPREHENSIVE METABOLIC PANEL   CBC     All laboratory results are individually reviewed and interpreted by me in the clinical context of this patient.  See ED course below for results interpretation if applicable.  (A negative COVID-19 test should be interpreted as COVID no longer suspected unless otherwise noted in this encounter documentation note)  (Any cultures that may have been sent were not resulted at the time of this patient ED visit)      Radiologic studies results available at the moment of this note (None if blank):  XR CHEST (2 VW)   Final Result   Stable radiographic appearance of the chest. No evidence of an acute   process.               **This report has been created using voice recognition software. It may contain   minor errors which are inherent in voice recognition technology.**      Electronically signed by Dr. Macie Krishnamurthy        See ED course below for my interpretation if applicable.  All radiology images independently reviewed by me in the clinical context of this patient, in addition to interpretation provided by the radiologist.      EKG interpretation:  See ED course (if applicable) [none if blank]  All EKG results are individually reviewed and interpreted by me in the clinical context of this patient.  All EKGs are also interpreted by our Cardiology department, final interpretation may not be available as of the writing of this note.      PREVIOUS RECORDS  AND EXTERNAL INFORMATION REVIEWED

## 2025-01-01 NOTE — DISCHARGE INSTRUCTIONS
Patient has what appears to be a viral URI with cough.  Patient is instructed use over-the-counter cough cold and flu medication.  They have been given prescriptions for Zyrtec and Flonase they are instructed to give it as prescribed.  They are instructed to follow-up with a primary care physician and do so within the next 1 to 2 days.  They are instructed return to the nearest emergency room immediately for any new or worsening complaints

## 2025-01-01 NOTE — ED PROVIDER NOTES
Result   Stable radiographic appearance of the chest. No evidence of an acute   process.               **This report has been created using voice recognition software. It may contain   minor errors which are inherent in voice recognition technology.**      Electronically signed by Dr. Macie Krishnamurthy        Labs Reviewed   COMPREHENSIVE METABOLIC PANEL - Abnormal; Notable for the following components:       Result Value    Total Bilirubin <0.2 (*)     ALT 9 (*)     All other components within normal limits   OSMOLALITY - Abnormal; Notable for the following components:    Osmolality Calc 274.0 (*)     All other components within normal limits   COVID-19 & INFLUENZA COMBO   CULTURE, THROAT    Narrative:     Source: Specimen not received       Site:           Current Antibiotics:   GROUP A STREP, REFLEX   CBC   ANION GAP   GLOMERULAR FILTRATION RATE, ESTIMATED   Patient has what appears to be a viral URI with cough.  Patient is instructed use over-the-counter cough cold and flu medication.  They have been given prescriptions for Zyrtec and Flonase they are instructed to give it as prescribed.  They are instructed to follow-up with a primary care physician and do so within the next 1 to 2 days.  They are instructed return to the nearest emergency room immediately for any new or worsening complaints      Final diagnoses:   Upper respiratory tract infection, unspecified type   Viral pharyngitis   .  I have seen this patient with the resident Dr. Harden, and agree with his assessment and plan     Giuseppe De La O, DO  01/01/25 [id number]

## 2025-01-01 NOTE — ED TRIAGE NOTES
Patient presents to ED with c/o sore throat, fever, and cough x4 days. States that he has been trying over the counter medication with no relief. Call light in reach.

## 2025-01-05 LAB — BACTERIA THROAT AEROBE CULT: NORMAL
